# Patient Record
Sex: FEMALE | Race: WHITE
[De-identification: names, ages, dates, MRNs, and addresses within clinical notes are randomized per-mention and may not be internally consistent; named-entity substitution may affect disease eponyms.]

---

## 2017-03-02 ENCOUNTER — HOSPITAL ENCOUNTER (OUTPATIENT)
Dept: HOSPITAL 62 - RAD | Age: 72
End: 2017-03-02
Attending: SPECIALIST
Payer: MEDICARE

## 2017-03-02 DIAGNOSIS — R13.12: ICD-10-CM

## 2017-03-02 DIAGNOSIS — K21.9: Primary | ICD-10-CM

## 2017-03-02 PROCEDURE — 92611 MOTION FLUOROSCOPY/SWALLOW: CPT

## 2017-03-02 PROCEDURE — 74230 X-RAY XM SWLNG FUNCJ C+: CPT

## 2017-03-02 NOTE — ST MODIFIED BARIUM SWALLOW
Recommendation





- Recommendations


Recommendations: 1) DIET: Recommend mechanical soft cut meats and thin liquids.

  2) STRATEGIES: Alternate bites and sips-to aid in clearing residuals.  3) Pt 

reports has follow-up with GI, ST in agreement. Esophageal stage-RA noted 

possible Zenker's diverticulum.  4) Pt reports difficulty taking pills. ST 

recommends attempting pills in puree, if continued difficulty crush pills if 

able in puree.  5) THERAPY: ST recommends home health or outpatient speech 

therapy targeting pharyngeal strengthening.  SUMMARY:  Pt presents with a mild-

moderate oropharyngeal, mild-moderate pharyngeal dysphagia, moderate 

pharyngoesophageal dysphagia characterized by reduced base of tongue, reduced 

laryngeal elevation, reduced hyolaryngeal excursion, reduced opening of 

cricopharyngeus. Deficits resulting in residuals of solids at the level of the 

valleculae and the pyriforms. Residuals observed to clear with liquid wash. 

Prominance at approximately level of C5 likely from osteophyte. At end of study

, during recommendations, pt began to make glottal noises - not judged to be a 

cough by ST.  stated "she thats what she does!" No evidence of 

pharyngeal residuals observed at end of study to indicate cause.





Medical Diagnoses





- Medical Diagnoses


Medical Diagnosis Description & ICD-10 Code(s): GERD K21.9


Other Medical Diagnoses/Co-Morbidities: dementia, reflux, stomach ulcer, 

diabetes





- ICD-10 Tx Diagnosis Coding


(1) Dysphagia, oropharyngeal phase


ICD-10 Code(s): R13.12 - DYSPHAGIA, OROPHARYNGEAL PHASE





(2) Dysphagia, pharyngeal phase


ICD-10 Code(s): R13.13 - DYSPHAGIA, PHARYNGEAL PHASE





(3) Dysphagia, pharyngoesophageal phase


ICD-10 Code(s): R13.14 - DYSPHAGIA, PHARYNGOESOPHAGEAL PHASE








ST Modified Barium Swallow





- General


Date: 03/02/17


Referring Physician: Dr Harrington


Risks/Precautions: None


Date of Onset: 11/03/16


Reason for Referral: GERD





- History


History obtained from: Patient - pt reports dementia-poor historian throughout 

history intake. Pt's  providing most of information., Parent/Caregiver


-: Medical - Pt referred by GI due to GERD. Pt's  states pt coughs and 

chokes on solids and liquids, coughing continues after meals. Pt denies globus 

sensation during meals however states feels sticking with medications. Pt's 

 denies history of PNA or bronchitis. Pt's  states onset of 

symptoms began approximately 4 months ago.  PMHx: pt's  states pt seen 

by ENT who stated no irritation of vocal folds and WNL ENT then referred to GI. 

Pt recently admitted to hospital per pt's  for a stomach ulcer. Dementia

, reflux, diabetes, back pain.


Medications: protonix, metformin, tramadol, vitamin D3, calcium, synthroid, and 

"about a half a dozen other vitamins" per pt's .


Allergies: cipro





- Functional Status


Prior Functional Status: INDEPENDENT: feeding


Current Functional Limitations: feeding





- Subjective


Patient/caregiver goal(s): safe swallow


Cognitive-Linguistic Function: Moderately Impaired - history of dementia


Speech Intelligibility: WNL


Current Nutritional Means: PO


Current PO diet: Soft


Current symptoms: Coughing


Pain: 0/5





- Objective


Assessment: Upright, Left Lateral





- Food Trials Used


Food trials used: Thin liquids, Pureed, Soft solids


The patient: Was Able to Self Feed





- Oral-Motor Skills


Velo-pharyngeal function: Unremarkable


Laryngeal Function: Volitional Cough, Volitional Swallow





- Assessment


Oral prep: Normal


Labial closure: Adequate


Leakage: None


Mastication: Adequate


Lingual Movement: Weak - weak anterior propulsion


Oral stage: Mildly Impaired





- Pharyngeal Stage


Initiation of Pharyngeal Stage Reflex: Normal


Decreased laryngeal elevation: Yes - mild


Reduced Velopharyngeal Closure: no


Reduced pressure generation: Yes - moderate


reduced tongue-based retraction: Yes - moderate


Pre-swallow pooling in valleculae: None


Pre-Swallow pooling in pyriforms: None


Reduced epiglottic excursion: Yes - mild


Reduced pharyngeal peristalsis/contraction: Yes - mild-moderate


Post-swallow residulas vallecular: Moderate - on solids


Post-Swallow residuals in pyriforms: Moderate - on solids


Reduced Cricopharyngeal opening: Yes





- Fall Risk Assessment


Medications/Conditions that increase fall risks include: Antidepressants, 

sedatives, anti-arrhythmic, diuretic, benzodiazipenes, neuroleptics.  BP 

regulation problems, cardiac problems, balance or gait deficits, neurological 

problems.


Is patient considered at risk for falls: no


Fall Risk Actions Taken: No action needed





- Behavioral Observations


During evaluation process patient: was pleasant, was cooperative





- Treatment / Educational Needs:


Treatment/Education Needs: Treatment consisted of patient education on the role 

of the Speech Pathologist.  Patient's plan of care and golas were communicated 

as well as scheduling and attendance policies.  Recommendations for initial 

home program were shared.  Patient demonstrated understanding and verbalized 

agreement.


Initial home program recommendations: pt and pt's  provided with verbal 

and written education on aspiration PNA, recommendations from study.





- Impression/Summary


Laryngeal Penetration: Yes, Flash, Cleared


Consistency: Thin


Tracheal Aspiration: no


Patient presents with: Pharyngeal stage dysph., Oral-Pharyngeal dysph.


Risk of Aspiration: Moderate - mild-moderate





- Recommendations


NPO: no


Solid diet recommendations: Mechanical Soft, Chopped Meat


Liquid Diet Modification: Thin


Strict aspiration precautions: Yes


Pt/Family education and followup with MD: Yes


Dysphagia therapy with SLP: yes, dysphagia therapy


Recommended techniques: Fully Upright During Meal, Small Bites and Sips, 

Alternate Bites/Sips


Supervision: Distant


Information, Precautions and Recommendations: Patient (Written), Patient (Verbal

), Family Member (Written), Family Member (Verbal)





- Time


Total Time: 30





- Plan of Care


Strategies to optimize patient understanding include:: ongoing assessment of 

educational needs, implementation of educational strategies, and re-education.





- -


-: Thank you for the opportunity to work with this patient and his/her family.  

Should you have any questions about this patient's plan or progress, I can be 

reached at [phone].





Charge G Code?





- -


-: Yes





ST F.L. Impairment Category





- Rationale Based On


Rationale Based On: Clin Find., Obj Measures





- Swallowing


Current : CK 40-59% Impaired


Goal : CK 40-59% Impaired


Discharge : CK 40-59% Impaired

## 2017-03-30 ENCOUNTER — HOSPITAL ENCOUNTER (OUTPATIENT)
Dept: HOSPITAL 62 - OD | Age: 72
End: 2017-03-30
Attending: SPECIALIST
Payer: MEDICARE

## 2017-03-30 DIAGNOSIS — R10.9: Primary | ICD-10-CM

## 2017-03-30 DIAGNOSIS — D50.9: ICD-10-CM

## 2017-03-30 LAB
BASOPHILS # BLD AUTO: 0 10^3/UL (ref 0–0.2)
BASOPHILS NFR BLD AUTO: 0.4 % (ref 0–2)
EOSINOPHIL # BLD AUTO: 0 10^3/UL (ref 0–0.6)
EOSINOPHIL NFR BLD AUTO: 0.5 % (ref 0–6)
ERYTHROCYTE [DISTWIDTH] IN BLOOD BY AUTOMATED COUNT: 13.5 % (ref 11.5–14)
HCT VFR BLD CALC: 32.6 % (ref 36–47)
HGB BLD-MCNC: 11.2 G/DL (ref 12–15.5)
HGB HCT DIFFERENCE: 1
LYMPHOCYTES # BLD AUTO: 2.6 10^3/UL (ref 0.5–4.7)
LYMPHOCYTES NFR BLD AUTO: 30.7 % (ref 13–45)
MCH RBC QN AUTO: 31.2 PG (ref 27–33.4)
MCHC RBC AUTO-ENTMCNC: 34.4 G/DL (ref 32–36)
MCV RBC AUTO: 91 FL (ref 80–97)
MONOCYTES # BLD AUTO: 0.8 10^3/UL (ref 0.1–1.4)
MONOCYTES NFR BLD AUTO: 9 % (ref 3–13)
NEUTROPHILS # BLD AUTO: 5 10^3/UL (ref 1.7–8.2)
NEUTS SEG NFR BLD AUTO: 59.4 % (ref 42–78)
RBC # BLD AUTO: 3.59 10^6/UL (ref 3.72–5.28)
WBC # BLD AUTO: 8.4 10^3/UL (ref 4–10.5)

## 2017-03-30 PROCEDURE — 36415 COLL VENOUS BLD VENIPUNCTURE: CPT

## 2017-03-30 PROCEDURE — 85025 COMPLETE CBC W/AUTO DIFF WBC: CPT

## 2017-03-30 PROCEDURE — 83540 ASSAY OF IRON: CPT

## 2017-06-12 ENCOUNTER — HOSPITAL ENCOUNTER (EMERGENCY)
Dept: HOSPITAL 62 - ER | Age: 72
Discharge: HOME | End: 2017-06-12
Payer: MEDICARE

## 2017-06-12 VITALS — DIASTOLIC BLOOD PRESSURE: 45 MMHG | SYSTOLIC BLOOD PRESSURE: 90 MMHG

## 2017-06-12 DIAGNOSIS — R11.0: ICD-10-CM

## 2017-06-12 DIAGNOSIS — Z87.440: ICD-10-CM

## 2017-06-12 DIAGNOSIS — R19.7: ICD-10-CM

## 2017-06-12 DIAGNOSIS — M54.41: ICD-10-CM

## 2017-06-12 DIAGNOSIS — Z79.891: ICD-10-CM

## 2017-06-12 DIAGNOSIS — G89.29: Primary | ICD-10-CM

## 2017-06-12 DIAGNOSIS — R63.0: ICD-10-CM

## 2017-06-12 DIAGNOSIS — E11.9: ICD-10-CM

## 2017-06-12 LAB
ALBUMIN SERPL-MCNC: 3.7 G/DL (ref 3.5–5)
ALP SERPL-CCNC: 52 U/L (ref 38–126)
ALT SERPL-CCNC: 32 U/L (ref 9–52)
ANION GAP SERPL CALC-SCNC: 10 MMOL/L (ref 5–19)
APPEARANCE UR: (no result)
AST SERPL-CCNC: 24 U/L (ref 14–36)
BASOPHILS # BLD AUTO: 0 10^3/UL (ref 0–0.2)
BASOPHILS NFR BLD AUTO: 0.5 % (ref 0–2)
BILIRUB DIRECT SERPL-MCNC: 0.3 MG/DL (ref 0–0.4)
BILIRUB SERPL-MCNC: 0.7 MG/DL (ref 0.2–1.3)
BILIRUB UR QL STRIP: NEGATIVE
BUN SERPL-MCNC: 18 MG/DL (ref 7–20)
CALCIUM: 9.4 MG/DL (ref 8.4–10.2)
CHLORIDE SERPL-SCNC: 100 MMOL/L (ref 98–107)
CO2 SERPL-SCNC: 26 MMOL/L (ref 22–30)
CREAT SERPL-MCNC: 0.7 MG/DL (ref 0.52–1.25)
EOSINOPHIL # BLD AUTO: 0 10^3/UL (ref 0–0.6)
EOSINOPHIL NFR BLD AUTO: 0.3 % (ref 0–6)
ERYTHROCYTE [DISTWIDTH] IN BLOOD BY AUTOMATED COUNT: 13.1 % (ref 11.5–14)
GLUCOSE SERPL-MCNC: 100 MG/DL (ref 75–110)
GLUCOSE UR STRIP-MCNC: NEGATIVE MG/DL
HCT VFR BLD CALC: 32.6 % (ref 36–47)
HGB BLD-MCNC: 10.9 G/DL (ref 12–15.5)
HGB HCT DIFFERENCE: 0.1
KETONES UR STRIP-MCNC: NEGATIVE MG/DL
LIPASE SERPL-CCNC: 103.7 U/L (ref 23–300)
LYMPHOCYTES # BLD AUTO: 1.7 10^3/UL (ref 0.5–4.7)
LYMPHOCYTES NFR BLD AUTO: 16.7 % (ref 13–45)
MCH RBC QN AUTO: 31.1 PG (ref 27–33.4)
MCHC RBC AUTO-ENTMCNC: 33.4 G/DL (ref 32–36)
MCV RBC AUTO: 93 FL (ref 80–97)
MONOCYTES # BLD AUTO: 1.4 10^3/UL (ref 0.1–1.4)
MONOCYTES NFR BLD AUTO: 14.1 % (ref 3–13)
NEUTROPHILS # BLD AUTO: 6.9 10^3/UL (ref 1.7–8.2)
NEUTS SEG NFR BLD AUTO: 68.4 % (ref 42–78)
NITRITE UR QL STRIP: NEGATIVE
PH UR STRIP: 5 [PH] (ref 5–9)
POTASSIUM SERPL-SCNC: 3.8 MMOL/L (ref 3.6–5)
PROT SERPL-MCNC: 6.6 G/DL (ref 6.3–8.2)
PROT UR STRIP-MCNC: NEGATIVE MG/DL
RBC # BLD AUTO: 3.5 10^6/UL (ref 3.72–5.28)
SODIUM SERPL-SCNC: 135.9 MMOL/L (ref 137–145)
SP GR UR STRIP: 1.01
UROBILINOGEN UR-MCNC: NEGATIVE MG/DL (ref ?–2)
WBC # BLD AUTO: 10 10^3/UL (ref 4–10.5)

## 2017-06-12 PROCEDURE — 36415 COLL VENOUS BLD VENIPUNCTURE: CPT

## 2017-06-12 PROCEDURE — 96374 THER/PROPH/DIAG INJ IV PUSH: CPT

## 2017-06-12 PROCEDURE — 80053 COMPREHEN METABOLIC PANEL: CPT

## 2017-06-12 PROCEDURE — 81001 URINALYSIS AUTO W/SCOPE: CPT

## 2017-06-12 PROCEDURE — 72110 X-RAY EXAM L-2 SPINE 4/>VWS: CPT

## 2017-06-12 PROCEDURE — 85025 COMPLETE CBC W/AUTO DIFF WBC: CPT

## 2017-06-12 PROCEDURE — 99284 EMERGENCY DEPT VISIT MOD MDM: CPT

## 2017-06-12 PROCEDURE — 83690 ASSAY OF LIPASE: CPT

## 2017-06-12 PROCEDURE — 96375 TX/PRO/DX INJ NEW DRUG ADDON: CPT

## 2017-06-12 NOTE — ER DOCUMENT REPORT
ED Neck/Back Problem





- General


Mode of Arrival: Wheelchair


Information source: Patient, Relative


TRAVEL OUTSIDE OF THE [place] IN LAST 30 DAYS: No





- HPI


Patient complains to provider of: Pain, Lower back - right


Onset: Other - 'years'


Context: Other - see notes above


Associated symptoms: Other - see notes above





<BRIAN CALLE - Last Filed: 06/13/17 03:32>





<JENNIFER MCNEILL - Last Filed: 06/13/17 03:52>





- General


Chief Complaint: Back Pain


Stated Complaint: BODY PAIN


Time Seen by Provider: 06/12/17 19:50


Notes: 


72 year old female with history of chronic lower back pain presents to the ED 

complaining of right lower back pain that has worsened over the past 48 hours.  

Patient's  reports that the patient was having difficulty sleeping last 

night.  He states that the patient points to the right back when asked where 

the pain is located.   states that the pain bothers the patient, but she 

can usually manage it.  The pain worsened to the point that the  had to 

help the patient with a walker in order to use the bathroom.  States that the 

patient also has chronic diarrhea and was complaining of nausea this morning.  

Patient denies falling, urinary symptoms, or vomiting.  This persisted patient 

is less active has had poor appetite for the past 6 months.  Patient was 

recently taken off of metformin and 1 of iron pills. Patient was on Mobic 

earlier last year but was changed to Tramadol 5 months ago when she developed 

ulcers secondary to the Mobic. Patient has had an MRI in the past which was 

negative. (BRIAN CALLE)





- Related Data


Allergies/Adverse Reactions: 


 





No Known Allergies Allergy (Verified 10/05/13 09:58)


 











Past Medical History





- General


Information source: Patient





- Social History


Smoking Status: Unknown if Ever Smoked


Family History: Reviewed & Not Pertinent


Patient has suicidal ideation: No


Patient has homicidal ideation: No





- Past Medical History


Cardiac Medical History: Reports: Hx Hypercholesterolemia


Neurological Medical History: Denies: Hx Seizures


Endocrine Medical History: Reports: Hx Diabetes Mellitus Type 2


Renal/ Medical History: Reports: Other - urinary tract infection.  Denies: Hx 

Peritoneal Dialysis


GI Medical History: Reports: Hx Gastroesophageal Reflux Disease


Musculoskeltal Medical History: Reports Hx Arthritis


Psychiatric Medical History: Reports: Hx Dementia


Past Surgical History: Reports: Hx Appendectomy, Hx Hysterectomy





- Immunizations


Hx Diphtheria, Pertussis, Tetanus Vaccination: Yes





<BRIAN CALLE - Last Filed: 06/13/17 03:32>





Review of Systems





- Review of Systems


Constitutional: No symptoms reported


EENT: No symptoms reported


Cardiovascular: No symptoms reported


Respiratory: No symptoms reported


Gastrointestinal: See HPI, Diarrhea, Nausea, Poor appetite - 6 months.  denies: 

Vomiting


Genitourinary: No symptoms reported.  denies: Burning, Dysuria, Flank pain, 

Hematuria


Female Genitourinary: No symptoms reported


Musculoskeletal: See HPI, Back pain - lower right


Skin: No symptoms reported


Hematologic/Lymphatic: No symptoms reported


Neurological/Psychological: No symptoms reported


-: Yes All other systems reviewed and negative





<BRIAN CALLE - Last Filed: 06/13/17 03:32>





Physical Exam





- General


General appearance: Alert


In distress: None





- HEENT


Head: Normocephalic, Atraumatic


Eyes: Normal


Extraocular movements intact: Yes


Pupils: PERRL


Neck: Normal - non tender





- Respiratory


Respiratory status: No respiratory distress


Breath sounds: Normal





- Cardiovascular


Rhythm: Regular


Heart sounds: Normal auscultation





- Abdominal


Inspection: Normal


Distension: No distension


Bowel sounds: Normal


Tenderness: Nontender





- Back


Back: Normal, Nontender





- Extremities


General upper extremity: Normal inspection, Normal ROM


General lower extremity: Normal ROM.  No: Normal inspection - Her pain is over 

the right buttock region with positive right straight leg raise test.





- Neurological


Neuro grossly intact: Yes


Cognition: Normal


Orientation: AAOx4


Anuhska Coma Scale Eye Opening: Spontaneous


Anushka Coma Scale Verbal: Oriented


Anushka Coma Scale Motor: Obeys Commands


Anushka Coma Scale Total: 15


Speech: Normal





- Psychological


Associated symptoms: Normal affect, Normal mood





- Skin


Skin Temperature: Warm


Skin Moisture: Dry


Skin Color: Normal





<BRIAN CALLE - Last Filed: 06/13/17 03:32>





Course





- Laboratory


Result Diagrams: 


 06/12/17 20:40





 06/12/17 20:40





<BRIAN CALLE - Last Filed: 06/13/17 03:32>





- Laboratory


Result Diagrams: 


 06/12/17 20:40





 06/12/17 20:40





<JENNIFER MCNEILL - Last Filed: 06/13/17 03:52>





- Re-evaluation


Re-evalutation: 





06/12/17 23:15


With a history of dementia presents emerged from with her  with a chief 

complaint of low back pain.  He states she struggle with low back pain for a 

couple years and was put on Ultram in December.  She has episodes where the 

Ultram does not help it.  He said for the past 2 days anytime she moves or 

tries to change position in bed she yells out in pain and holds her leg.  He 

denies any recent fall fevers chills cough chest pain or shortness of breath 

has not been any nausea vomiting dull pain diarrhea or urinary complaints.  On 

examination she is well-appearing nontoxic afebrile with no midline cervical 

thoracic or lumbosacral tenderness.  Her pain is over the right buttock region 

with positive right straight leg raise test.  No pulsatile dullness or hernias 

good pulses and perfusion to lower extremities.  Full workup negative for acute 

urinary tract infection no concerns for aortic aneurysm or kidney stones.  

Significant improvement with pain shot and ambulated about the department with 

the nurse.  Feels significantly better and be discharged home with a 

prescription for a few days with the pain medication follow-up closely with 

primary care physician and discuss reasons for ED return to (JENNIFER MCNEILL)





- Vital Signs


Vital signs: 


 











Temp Pulse Resp BP Pulse Ox


 


 100 F   72   16   90/45 L  96 


 


 06/12/17 19:05  06/12/17 23:48  06/12/17 23:48  06/12/17 23:48  06/12/17 23:48














- Laboratory


Laboratory results interpreted by me: 


 











  06/12/17 06/12/17





  20:40 20:40


 


RBC  3.50 L 


 


Hgb  10.9 L 


 


Hct  32.6 L 


 


Monocytes %  14.1 H 


 


Sodium   135.9 L














Discharge





<BRIAN CALLE - Last Filed: 06/13/17 03:32>





<JENNIFER MCNEILL - Last Filed: 06/13/17 03:52>





- Discharge


Clinical Impression: 


Low back pain


Qualifiers:


 Chronicity: unspecified Back pain laterality: right Sciatica presence: with 

sciatica Sciatica laterality: sciatica of right side Qualified Code(s): M54.41 

- Lumbago with sciatica, right side





Condition: Stable


Disposition: HOME, SELF-CARE


Instructions:  Oral Narcotic Medication ([facility])


Additional Instructions: 


Low Back Pain





     Three out of every four people will have an episode of disabling back pain 

during their lifetime.  Most commonly the pain is due to straining of the 

muscles and ligaments in the low back.


     Usual treatment includes: 


(1) Rest on a firm surface.  Avoid lying on your stomach.  


(2) Ice pack the painful area.  After a few days, gentle heat may be used 

intermittently to relax the area, or ice packs can be continued.  


(3) Medication may be needed -- muscle relaxers and antiinflammatory medicines 

are commonly used.  


(4) As the back improves, exercises are prescribed to strengthen the back and 

abdominal muscles.


     Your doctor will advise you on the proper care for your back at each stage 

in your recovery.  You may be better in a few days -- or healing may take 

several weeks.


     If new symptoms of a "herniated disc" (radiation of pain, numbness, or 

tingling down the back of the leg or weakness in the leg) occur, you should be 

re-examined.  Further testing may be necessary.


Prescriptions: 


Hydrocodone/Acetaminophen [Norco 5-325 mg Tablet] 1 tab PO TID #8 tablet


Referrals: 


ANNE-MARIE FRAZIER MD [Primary Care Provider] -  (Call for an appointment in a.m. to be 

seen in follow-up in 2-3 days return for increasing worsening or new symptoms)


Scribe Attestation: 





06/12/17 23:15


I personally performed the services described in the documentation reviewed the 

documentation recorded by my scribe in my presence and it accurately and 

completely records my words and actions (JENNIFER MCNEILL)





Scribe Documentation





- Scribe


Written by Shelton:: Shelton Barrow, 06/12/2017 2146


acting as scribe for :: Polo





<BRIAN CALLE - Last Filed: 06/13/17 03:32>

## 2017-06-12 NOTE — RADIOLOGY REPORT (SQ)
EXAM DESCRIPTION:  L SPINE WHOLE



COMPLETED DATE/TIME:  6/12/2017 9:04 pm



REASON FOR STUDY:  pain



COMPARISON:  CT December 2016



NUMBER OF VIEWS:  Five views including obliques.



TECHNIQUE:  AP, lateral, oblique, and sacral radiographic images acquired of the lumbar spine.



LIMITATIONS:  None.



FINDINGS:  MINERALIZATION: Normal.

SEGMENTATION: Transitional S1.

ALIGNMENT: Similar grade 1 anterolisthesis of L5 on S1.

VERTEBRAE: Maintained height.  No fracture or worrisome bone lesion.

DISCS: Multilevel disc space narrowing with osteophytes.

POSTERIOR ELEMENTS: Pedicles and facets are intact.  No pars defect or posterior arch defects.  Sever
e Facet arthropathy is present.

HARDWARE: None in the spine.

PARASPINAL SOFT TISSUES: Normal.

PELVIS: Intact as visualized. No fractures or worrisome bone lesions. SI joints intact.

OTHER: No other significant finding.



IMPRESSION:  SPONDYLOSIS WITHOUT BONE LESION OR FRACTURE.



TECHNICAL DOCUMENTATION:  JOB ID:  [id number]

 2011 [employer]- All Rights Reserved

## 2017-10-25 ENCOUNTER — HOSPITAL ENCOUNTER (OUTPATIENT)
Dept: HOSPITAL 62 - END | Age: 72
Discharge: HOME | End: 2017-10-25
Attending: INTERNAL MEDICINE
Payer: MEDICARE

## 2017-10-25 VITALS — SYSTOLIC BLOOD PRESSURE: 114 MMHG | DIASTOLIC BLOOD PRESSURE: 74 MMHG

## 2017-10-25 DIAGNOSIS — K44.9: ICD-10-CM

## 2017-10-25 DIAGNOSIS — E11.9: ICD-10-CM

## 2017-10-25 DIAGNOSIS — Z79.84: ICD-10-CM

## 2017-10-25 DIAGNOSIS — K22.2: ICD-10-CM

## 2017-10-25 DIAGNOSIS — F03.90: ICD-10-CM

## 2017-10-25 DIAGNOSIS — K21.0: Primary | ICD-10-CM

## 2017-10-25 DIAGNOSIS — Z79.899: ICD-10-CM

## 2017-10-25 DIAGNOSIS — Z79.1: ICD-10-CM

## 2017-10-25 DIAGNOSIS — K29.50: ICD-10-CM

## 2017-10-25 PROCEDURE — 88342 IMHCHEM/IMCYTCHM 1ST ANTB: CPT

## 2017-10-25 PROCEDURE — 43239 EGD BIOPSY SINGLE/MULTIPLE: CPT

## 2017-10-25 PROCEDURE — 0DB58ZX EXCISION OF ESOPHAGUS, VIA NATURAL OR ARTIFICIAL OPENING ENDOSCOPIC, DIAGNOSTIC: ICD-10-PCS | Performed by: INTERNAL MEDICINE

## 2017-10-25 PROCEDURE — 88305 TISSUE EXAM BY PATHOLOGIST: CPT

## 2017-10-25 PROCEDURE — 0DB68ZX EXCISION OF STOMACH, VIA NATURAL OR ARTIFICIAL OPENING ENDOSCOPIC, DIAGNOSTIC: ICD-10-PCS | Performed by: INTERNAL MEDICINE

## 2017-10-25 PROCEDURE — 82962 GLUCOSE BLOOD TEST: CPT

## 2017-10-25 NOTE — OPERATIVE REPORT
Operative Report


DATE OF SURGERY: 10/25/17


Operative Report: 





The risks benefits and alternatives of the procedure explained to the patient 

in detail and informed consent is obtained.A GIF Olympus video scope was 

inserted into the patient's mouth and hypopharynx, the esophagus is identified 

intubated and insufflated, the scope was then advanced through the esophagus 

stomach and duodenum, retroflexion maneuver is done, the esophagus stomach and 

first and second portions of the duodenum examined


PREOPERATIVE DIAGNOSIS: Dysphagia, gastroesophageal reflux disease


POSTOPERATIVE DIAGNOSIS: Gastritis status post biopsy.  Hiatal hernia.  Schatzki

's ring status post breakage, biopsy sent rule out Freedman's


OPERATION: EGD with biopsy


SURGEON: JUAN JOSE VASQUEZ


ANESTHESIA: Moderate Sedation - 2 mg of Versed, 12.5 mg of fentanyl.  Conscious 

sedation monitoring time 30 minutes.


TISSUE REMOVED OR ALTERED: As noted above.


COMPLICATIONS: 





None.


ESTIMATED BLOOD LOSS: None.


INTRAOPERATIVE FINDINGS: As noted above.


PROCEDURE: 





Patient tolerated the procedure well


No immediate postprocedure complications are noted.


Patient discharged in good condition.


Discharge date 10/25/2017.


Discharge diet: Regular.


Discharge activity: Regular.


2-3 week follow-up to discuss findings.


Patient is instructed to call the office or proceed to the emergency room 

should there be any further problems or questions.


We will wait on pathology.

## 2017-11-27 ENCOUNTER — HOSPITAL ENCOUNTER (OUTPATIENT)
Dept: HOSPITAL 62 - WI | Age: 72
End: 2017-11-27
Attending: INTERNAL MEDICINE
Payer: MEDICARE

## 2017-11-27 DIAGNOSIS — Z12.31: Primary | ICD-10-CM

## 2017-11-27 PROCEDURE — G0202 SCR MAMMO BI INCL CAD: HCPCS

## 2017-11-27 PROCEDURE — 77067 SCR MAMMO BI INCL CAD: CPT

## 2017-11-28 NOTE — WOMENS IMAGING REPORT
EXAM DESCRIPTION:  BILAT SCREENING MAMMO W/CAD



COMPLETED DATE/TIME:  11/27/2017 9:11 am



REASON FOR STUDY:  SCREENING MAMMO Z12.31  ENCNTR SCREEN MAMMOGRAM FOR MALIGNANT NEOPLASM OF GREGG



COMPARISON:  Multiple since 2011



TECHNIQUE:  Standard craniocaudal and mediolateral oblique views of each breast recorded using digita
l acquisition.



LIMITATIONS:  None.



FINDINGS:  Findings present which are benign by mammographic criteria.  No suspicious masses, calcifi
cations or architectural distortion.

Pertinent benign findings: Stable benign arterial vascular and breast parenchymal calcifications

Read with the assistance of CAD.

.[facility] - R2 Cenova Version 1.3

.[facility] - R2 Cenova Version 1.3

.[facility] - R2 Cenova Version 2.4

.[facility] - R2 Cenova Version 2.4

.[facility] - R2  Version 9.2

Benign mammographic findings may include one or more of the following:  Smooth masses, popcorn/rim/co
arse calcifications, asymmetries, post-procedure changes, and lesions with long-standing stability.



IMPRESSION:  BENIGN MAMMOGRAPHIC FINDINGS.  BIRADS 2



BREAST DENSITY:  c. The breasts are heterogeneously dense, which may obscure small masses.



BIRAD:  2 BENIGN FINDING(S)



RECOMMENDATION:  ROUTINE SCREENING

Please consider bilateral screening tomosynthesis in November 2018, given heterogeneously dense tissu
e



COMMENT:  The patient has been notified of the results by letter per SA requirements. Additional no
tification policies are in place for contacting patient with suspicious or incomplete findings.

Quality ID #[id number]: The American College of Radiology recommends an annual screening mammogram for women
 aged 40 years or over. This facility utilizes a reminder system to ensure that all patients receive 
reminder letters, and/or direct phone calls for appointments. This includes reminders for routine scr
eening mammograms, diagnostic mammograms, or other Breast Imaging Interventions when appropriate.  Th
is patient will be placed in the appropriate reminder system.

The American College of Radiology (ACR) has developed recommendations for screening MRI of the breast
s in certain patient populations, to be used in conjunction with mammography.  Breast MRI surveillanc
e may be appropriate for women with more than 20% lifetime risk of developing breast cancer  as deter
mined by genetic testing, significant family history of the disease, or history of mantle radiation f
or Hodgkins Disease.  ACR Practice Guidelines 2008.



TECHNICAL DOCUMENTATION:  FINDING NUMBER: (1)

ASSESSMENT: (1)

JOB ID:  [id number]

 2011 [employer]- All Rights Reserved

## 2019-01-11 ENCOUNTER — HOSPITAL ENCOUNTER (OUTPATIENT)
Dept: HOSPITAL 62 - WI | Age: 74
End: 2019-01-11
Attending: INTERNAL MEDICINE
Payer: MEDICARE

## 2019-01-11 DIAGNOSIS — M81.0: ICD-10-CM

## 2019-01-11 DIAGNOSIS — Z12.31: Primary | ICD-10-CM

## 2019-01-11 PROCEDURE — 77063 BREAST TOMOSYNTHESIS BI: CPT

## 2019-01-11 PROCEDURE — 77080 DXA BONE DENSITY AXIAL: CPT

## 2019-01-11 PROCEDURE — 77067 SCR MAMMO BI INCL CAD: CPT

## 2019-01-11 NOTE — WOMENS IMAGING REPORT
EXAM DESCRIPTION:  BONE DENSITY HIP/SPINE



COMPLETED DATE/TIME:  1/11/2019 10:04 am



REASON FOR STUDY:  M81.0 Z12.31  ENCNTR SCREEN MAMMOGRAM FOR MALIGNANT NEOPLASM OF GREGG M81.0  AGE-REL
ATED OSTEOPOROSIS W/O CURRENT PATHOLOGICAL FRAC



COMPARISON:   11/22/2016.



TECHNIQUE:  Dual-Energy X-ray Absorptiometry (DEXA) of the AP Spine and Hip.



LIMITATIONS:  None.



FINDINGS:  LUMBAR SPINE:

The bone mineral density (BMD) measured from L1-L4 in the AP projection correlates with a T-score of 
-0.3, which is normal as defined by the World Health Organization.

HIP:

The bone mineral density (BMD) measured in the left hip correlates with a T-score of -1.5, which is o
steopenia as defined by the World Health Organization.



IMPRESSION:  1. LUMBAR SPINE: NORMAL.

2.  HIP: OSTEOPENIA.



COMMENT:  The World Health Organization defines low BMD as follows:

T-score:

Normal:  Greater than -1.0

Osteopenia: Between -1.0 and -2.5

Osteoporosis:  Less than -2.5 without fractures

Established osteoporosis:  Less than -2.5 with fractures

In general, you may wish to consider:

Diagnosis          Treatment                     Follow-up DEXA

Normal BMD      Prevention                    2-3 years

Osteopenia       Prevention/Therapy        1-2 years

Osteoporosis     Therapy                        Yearly



TECHNICAL DOCUMENTATION:  JOB ID:  [id number]

 [employer]- All Rights Reserved



Reading location - IP/workstation name: [email or web address]

## 2019-01-11 NOTE — WOMENS IMAGING REPORT
EXAM DESCRIPTION:  3D SCREENING MAMMO BILAT



COMPLETED DATE/TIME:  1/11/2019 10:04 am



REASON FOR STUDY:  ROUTINE SCREENING MAMMOGRAM Z12.31 Z12.31  ENCNTR SCREEN MAMMOGRAM FOR MALIGNANT N
EOPLASM OF GREGG M81.0  AGE-RELATED OSTEOPOROSIS W/O CURRENT PATHOLOGICAL FRAC



COMPARISON:  11/27/2017 and 11/22/2016.



TECHNIQUE:  Standard craniocaudal and mediolateral oblique views of each breast recorded using digita
l acquisition and breast tomosynthesis.



LIMITATIONS:  None.



FINDINGS:  No masses, calcifications or architectural distortion. No areas of suspicion.

Read with the assistance of CAD.

.[facility] - R2 Cenova Version 1.3

.[facility] - R2 Cenova Version 1.3

.[facility] - R2 Cenova Version 2.4

.[facility] [place] - R2 Cenova Version 2.4

.[facility] - R2  Version 9.2



IMPRESSION:  NORMAL MAMMOGRAM.  BIRADS 1.



BREAST DENSITY:  c. The breasts are heterogeneously dense, which may obscure small masses.



BIRAD:  1 NEGATIVE



RECOMMENDATION:  ROUTINE SCREENING



COMMENT:  The patient has been notified of the results by letter per SA requirements. Additional no
tification policies are in place for contacting patient with suspicious or incomplete findings.

Quality ID #225: The American College of Radiology recommends an annual screening mammogram for women
 aged 40 years or over. This facility utilizes a reminder system to ensure that all patients receive 
reminder letters, and/or direct phone calls for appointments. This includes reminders for routine scr
eening mammograms, diagnostic mammograms, or other Breast Imaging Interventions when appropriate.  Th
is patient will be placed in the appropriate reminder system.

The American College of Radiology (ACR) has developed recommendations for screening MRI of the breast
s in certain patient populations, to be used in conjunction with mammography.  Breast MRI surveillanc
e may be appropriate for women with more than 20% lifetime risk of developing breast cancer  as deter
mined by genetic testing, significant family history of the disease, or history of mantle radiation f
or Hodgkins Disease.  ACR Practice Guidelines 2008.

[employer]



PQRS 6045F: Fluoroscopic imaging is not utilized for breast tomosynthesis.



TECHNICAL DOCUMENTATION:  FINDING NUMBER: (1)

ASSESSMENT:  (1)

JOB ID:  [id number]

 2011 [employer]- All Rights Reserved



Reading location - IP/workstation name: [facility]-RR2

## 2019-07-20 ENCOUNTER — HOSPITAL ENCOUNTER (INPATIENT)
Dept: HOSPITAL 62 - ER | Age: 74
LOS: 8 days | DRG: 690 | End: 2019-07-28
Attending: INTERNAL MEDICINE | Admitting: INTERNAL MEDICINE
Payer: MEDICARE

## 2019-07-20 DIAGNOSIS — E03.9: ICD-10-CM

## 2019-07-20 DIAGNOSIS — B96.20: ICD-10-CM

## 2019-07-20 DIAGNOSIS — F02.80: ICD-10-CM

## 2019-07-20 DIAGNOSIS — N30.00: Primary | ICD-10-CM

## 2019-07-20 DIAGNOSIS — Z66: ICD-10-CM
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
## 2019-07-20 DIAGNOSIS — R13.13: ICD-10-CM
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
## 2019-07-20 DIAGNOSIS — D72.829: ICD-10-CM

## 2019-07-20 DIAGNOSIS — E11.8: ICD-10-CM
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
## 2019-07-20 DIAGNOSIS — G30.9: ICD-10-CM

## 2019-07-20 DIAGNOSIS — G93.40: ICD-10-CM

## 2019-07-20 LAB
ADD MANUAL DIFF: NO
ALBUMIN SERPL-MCNC: 3.8 G/DL (ref 3.5–5)
ALP SERPL-CCNC: 61 U/L (ref 38–126)
ALT SERPL-CCNC: 33 U/L (ref 9–52)
ANION GAP SERPL CALC-SCNC: 8 MMOL/L (ref 5–19)
APPEARANCE UR: (no result)
APTT PPP: YELLOW S
AST SERPL-CCNC: 29 U/L (ref 14–36)
BASOPHILS # BLD AUTO: 0 10^3/UL (ref 0–0.2)
BASOPHILS NFR BLD AUTO: 0.2 % (ref 0–2)
BILIRUB DIRECT SERPL-MCNC: 0.2 MG/DL (ref 0–0.4)
BILIRUB SERPL-MCNC: 0.4 MG/DL (ref 0.2–1.3)
BILIRUB UR QL STRIP: NEGATIVE
BUN SERPL-MCNC: 18 MG/DL (ref 7–20)
CALCIUM: 9.5 MG/DL (ref 8.4–10.2)
CHLORIDE SERPL-SCNC: 107 MMOL/L (ref 98–107)
CK SERPL-CCNC: 125 U/L (ref 30–135)
CO2 SERPL-SCNC: 28 MMOL/L (ref 22–30)
EOSINOPHIL # BLD AUTO: 0 10^3/UL (ref 0–0.6)
EOSINOPHIL NFR BLD AUTO: 0.1 % (ref 0–6)
ERYTHROCYTE [DISTWIDTH] IN BLOOD BY AUTOMATED COUNT: 13.1 % (ref 11.5–14)
FREE T4 (FREE THYROXINE): 1.65 NG/DL (ref 0.78–2.19)
GLUCOSE SERPL-MCNC: 133 MG/DL (ref 75–110)
GLUCOSE UR STRIP-MCNC: NEGATIVE MG/DL
HCT VFR BLD CALC: 34.5 % (ref 36–47)
HGB BLD-MCNC: 11.3 G/DL (ref 12–15.5)
KETONES UR STRIP-MCNC: NEGATIVE MG/DL
LYMPHOCYTES # BLD AUTO: 0.9 10^3/UL (ref 0.5–4.7)
LYMPHOCYTES NFR BLD AUTO: 6 % (ref 13–45)
MCH RBC QN AUTO: 30.6 PG (ref 27–33.4)
MCHC RBC AUTO-ENTMCNC: 32.9 G/DL (ref 32–36)
MCV RBC AUTO: 93 FL (ref 80–97)
MONOCYTES # BLD AUTO: 0.9 10^3/UL (ref 0.1–1.4)
MONOCYTES NFR BLD AUTO: 6.2 % (ref 3–13)
NEUTROPHILS # BLD AUTO: 12.8 10^3/UL (ref 1.7–8.2)
NEUTS SEG NFR BLD AUTO: 87.5 % (ref 42–78)
NITRITE UR QL STRIP: NEGATIVE
PH UR STRIP: 6 [PH] (ref 5–9)
PLATELET # BLD: 214 10^3/UL (ref 150–450)
POTASSIUM SERPL-SCNC: 4.1 MMOL/L (ref 3.6–5)
PROT SERPL-MCNC: 6.8 G/DL (ref 6.3–8.2)
PROT UR STRIP-MCNC: NEGATIVE MG/DL
RBC # BLD AUTO: 3.71 10^6/UL (ref 3.72–5.28)
SP GR UR STRIP: 1.01
T3FREE SERPL-MCNC: 3.59 PG/ML (ref 2.77–5.27)
TOTAL CELLS COUNTED % (AUTO): 100 %
UROBILINOGEN UR-MCNC: NEGATIVE MG/DL (ref ?–2)
WBC # BLD AUTO: 14.6 10^3/UL (ref 4–10.5)

## 2019-07-20 PROCEDURE — 36415 COLL VENOUS BLD VENIPUNCTURE: CPT

## 2019-07-20 PROCEDURE — 80053 COMPREHEN METABOLIC PANEL: CPT

## 2019-07-20 PROCEDURE — 84484 ASSAY OF TROPONIN QUANT: CPT

## 2019-07-20 PROCEDURE — S0164 INJECTION PANTROPRAZOLE: HCPCS

## 2019-07-20 PROCEDURE — 87186 SC STD MICRODIL/AGAR DIL: CPT

## 2019-07-20 PROCEDURE — 81001 URINALYSIS AUTO W/SCOPE: CPT

## 2019-07-20 PROCEDURE — 72125 CT NECK SPINE W/O DYE: CPT

## 2019-07-20 PROCEDURE — 70551 MRI BRAIN STEM W/O DYE: CPT

## 2019-07-20 PROCEDURE — 51701 INSERT BLADDER CATHETER: CPT

## 2019-07-20 PROCEDURE — 93010 ELECTROCARDIOGRAM REPORT: CPT

## 2019-07-20 PROCEDURE — 85025 COMPLETE CBC W/AUTO DIFF WBC: CPT

## 2019-07-20 PROCEDURE — 99285 EMERGENCY DEPT VISIT HI MDM: CPT

## 2019-07-20 PROCEDURE — 87040 BLOOD CULTURE FOR BACTERIA: CPT

## 2019-07-20 PROCEDURE — 87088 URINE BACTERIA CULTURE: CPT

## 2019-07-20 PROCEDURE — 70450 CT HEAD/BRAIN W/O DYE: CPT

## 2019-07-20 PROCEDURE — 84481 FREE ASSAY (FT-3): CPT

## 2019-07-20 PROCEDURE — 93005 ELECTROCARDIOGRAM TRACING: CPT

## 2019-07-20 PROCEDURE — 72192 CT PELVIS W/O DYE: CPT

## 2019-07-20 PROCEDURE — 87086 URINE CULTURE/COLONY COUNT: CPT

## 2019-07-20 PROCEDURE — 82550 ASSAY OF CK (CPK): CPT

## 2019-07-20 PROCEDURE — 84443 ASSAY THYROID STIM HORMONE: CPT

## 2019-07-20 PROCEDURE — 84439 ASSAY OF FREE THYROXINE: CPT

## 2019-07-20 RX ADMIN — HEPARIN SODIUM SCH UNIT: 5000 INJECTION, SOLUTION INTRAVENOUS; SUBCUTANEOUS at 14:39

## 2019-07-20 RX ADMIN — CEPHALEXIN ONE: 500 CAPSULE ORAL at 10:21

## 2019-07-20 RX ADMIN — DOCUSATE SODIUM SCH: 100 CAPSULE, LIQUID FILLED ORAL at 18:14

## 2019-07-20 RX ADMIN — SODIUM CHLORIDE PRN MLS/HR: 9 INJECTION, SOLUTION INTRAVENOUS at 14:40

## 2019-07-20 RX ADMIN — HEPARIN SODIUM SCH UNIT: 5000 INJECTION, SOLUTION INTRAVENOUS; SUBCUTANEOUS at 22:51

## 2019-07-20 RX ADMIN — CEPHALEXIN ONE MG: 500 CAPSULE ORAL at 10:07

## 2019-07-20 RX ADMIN — SIMVASTATIN SCH: 10 TABLET, FILM COATED ORAL at 22:48

## 2019-07-20 NOTE — RADIOLOGY REPORT (SQ)
EXAM DESCRIPTION:  CT HEAD WITHOUT



COMPLETED DATE/TIME:  7/20/2019 7:46 am



REASON FOR STUDY:  fall down stairs, dementia, non-verbal



COMPARISON:  10/8/2013.



TECHNIQUE:  Axial images acquired through the brain without intravenous contrast.  Images reviewed wi
th bone, brain and subdural windows.  Additional sagittal and coronal reconstructions were generated.
 Images stored on PACS.

All CT scanners at this facility use dose modulation, iterative reconstruction, and/or weight based d
osing when appropriate to reduce radiation dose to as low as reasonably achievable (ALARA).

[facility]: Dose Right  [facility]: CareDose    [facility]: Dose Right    [facility]: Teradose 4D    [facility]: Smart Black coin



RADIATION DOSE:  CT Rad equipment meets quality standard of care and radiation dose reduction techniq
ues were employed. CTDIvol: 53.2 mGy. DLP: 937 mGy-cm.mGy.



LIMITATIONS:  None.



FINDINGS:  VENTRICLES: Prominent.

CEREBRUM: No masses.  No hemorrhage.  No midline shift.  Areas of low density in the white matter mos
t likely due to chronic micro-vascular ischemic change.  No evidence for acute infarction.

CEREBELLUM: No masses.  No hemorrhage.  No alteration of density.  No evidence for acute infarction.

EXTRAAXIAL SPACES: Age-related involutional change.  No fluid collections.  No masses.

ORBITS AND GLOBE: No intra- or extraconal masses.  Normal contour of globe without masses.

CALVARIUM: No fracture.

PARANASAL SINUSES: No fluid or mucosal thickening.

SOFT TISSUES: No mass or hematoma.

OTHER: No other significant finding.



IMPRESSION:  CHRONIC CHANGES OF ATROPHY AND MICROVASCULAR ISCHEMIA.  NO ACUTE PROCESS.

EVIDENCE OF ACUTE STROKE: NO.



TECHNICAL DOCUMENTATION:  JOB ID:  [id number]

Quality ID # 436: Final reports with documentation of one or more dose reduction techniques (e.g., Au
tomated exposure control, adjustment of the mA and/or kV according to patient size, use of iterative 
reconstruction technique)

 2011 [employer]- All Rights Reserved



Reading location - IP/workstation name: ANNE MARIE

## 2019-07-20 NOTE — RADIOLOGY REPORT (SQ)
EXAM DESCRIPTION:  CT CERVICAL SPINE WITHOUT



COMPLETED DATE/TIME:  7/20/2019 7:46 am



REASON FOR STUDY:  fall down stairs, dementia, non-verbal



COMPARISON:  None.



TECHNIQUE:  Axial images acquired through the cervical spine without intravenous contrast.  Images re
viewed with lung, soft tissue and bone windows.  Reconstructed coronal and sagittal MPR images review
ed.  Images stored on PACS.

All CT scanners at this facility use dose modulation, iterative reconstruction, and/or weight based d
osing when appropriate to reduce radiation dose to as low as reasonably achievable (ALARA).

[facility]: Dose Right  [facility]: CareDose    [facility]: Dose Right    [facility]: Teradose 4D    [facility]: Smart Technologies



RADIATION DOSE:  CT Rad equipment meets quality standard of care and radiation dose reduction techniq
ues were employed. CTDIvol: 7.4 mGy. DLP: 160 mGy-cm. mGy.



LIMITATIONS:  None.



FINDINGS:  ALIGNMENT: Anatomic.

MINERALIZATION: Normal.

VERTEBRAL BODIES: No fractures or dislocation.

DISCS: Multilevel disc space narrowing with osteophytes.

FACETS, LATERAL MASSES, POSTERIOR ELEMENTS: Facet arthropathy.  No fractures.  No dislocation.  No ac
kali findings.

HARDWARE: None in the spine.

VISUALIZED RIBS: No fractures.

LUNG APICES AND SOFT TISSUES: No significant or acute findings.

OTHER: No other significant finding.



IMPRESSION:  CHRONIC DEGENERATIVE CHANGES.  NO ACUTE FINDINGS.



TECHNICAL DOCUMENTATION:  JOB ID:  [id number]

Quality ID # 436: Final reports with documentation of one or more dose reduction techniques (e.g., Au
tomated exposure control, adjustment of the mA and/or kV according to patient size, use of iterative 
reconstruction technique)

 2011 [employer]- All Rights Reserved



Reading location - IP/workstation name: ANNE MARIE

## 2019-07-20 NOTE — PDOC H&P
History of Present Illness


Admission Date/PCP: 


  





  ANNE-MARIE FRAZIER MD





Patient complains of: Multiple falls with a fall down a flight of stairs this 

morning


History of Present Illness: 


KIRK BRANDT is a 74 year old female with advanced dementia.  She has a 

history of hypothyroidism and peptic ulcer disease but most prominently is her 

very advanced dementia.  Her  reports that she has had an unsteady gait 

for approximately a month.  He notices that when she ambulates she tends to be 

wobbly.  Is not specifically to any one side.  He states that she has fallen in 

the past but never down a flight of stairs.  He states that the patient was 

going to go downstairs.  He heard her fall.  When he rushed to her side she was 

at the bottom of a flight of 7 stairs.  The stairs were carpeted.  She was on 

the floor with her head against the wall.  He did not see any abrasions or 

lacerations.  He did say that she was not speaking and had a gurgling type 

breath sound.  Her eyes were wide open.  He went to call EMS and when he got 

back she had started talking again.  EMS then transported her to the emergency 

department.  On evaluation she does have white blood cells and trace leukocyte 

esterase in her urine.  Imaging did not reveal any acute injuries such as garcia

bdural hematoma or spinal compression fractures.  She may likely have suffered a

concussion.  With her presentation when her  first saw her concern for 

seizure activity is also present.  She was referred to the hospitalist service 

for admission.








Past Medical History


Cardiac Medical History: Reports: Hyperlipidema


   Denies: Coronary Artery Disease, Myocardial Infarction, Hypertension


Pulmonary Medical History: 


   Denies: Asthma, Bronchitis, Chronic Obstructive Pulmonary Disease (COPD), 

Pneumonia


EENT Medical History: 


   Denies: Cataracts, Ears, Nose


Neurological Medical History: Reports: Other - Dementia


   Denies: Hemorrhagic CVA, Seizures


Endocrine Medical History: Reports: Diabetes Mellitus Type 2, Hypothyroidism


Renal/ Medical History: 


   Denies: Chronic Kidney Disease


Malignancy Medical History: Reports: None


GI Medical History: Reports: Gastroesophageal Reflux Disease, Peptic Ulcer 

Disease


Musculoskeltal Medical History: Reports: Arthritis


Skin Medical History: 


   Denies: Eczema, Psoriasis


Psychiatric Medical History: Reports: Dementia


Traumatic Medical History: Reports: None


Hematology: 


   Denies: Anemia


Infectious Medical History: Reports: None





Past Surgical History


Past Surgical History: Reports: Appendectomy, Hysterectomy, Other - Upper 

endoscopy





Social History


Information Source: Relative - Her , [facility] Records


Lives with: Spouse/Significant other


Smoking Status: Never Smoker


Frequency of Alcohol Use: None


Hx Recreational Drug Use: No


Drugs: Cocaine


Hx Prescription Drug Abuse: No





- Advance Directive


Resuscitation Status: Do Not Resuscitate


Surrogate healthcare decision maker:: 





Both the patient and her  have living guillen.  The patient's  

states that he has brought them to the hospital before and should be on record.





Family History


Family History: CAD, Other - Dementia


Parental Family History Reviewed: Yes - Mother with dementia Father with heart 

disease


Children Family History Reviewed: Yes


Sibling(s) Family History Reviewed.: Yes





Medication/Allergy


Home Medications: 








Calcium Carb/Vitamin D3/Vit K1 [Calcium + D Soft Chewable Tab] 500 cap PO TID 

12/21/16 


Docusate Sodium [Colace 100 mg Capsule] 100 mg PO BID 12/21/16 


Ferrous Sulfate [Albafort] 325 mg PO DAILY 12/21/16 


Folic Acid 1 mg PO DAILY 12/21/16 


Levothyroxine Sodium [Synthroid 0.088 mg Tablet] 88 mcg PO DAILY 12/21/16 


Simvastatin [Zocor 20 mg Tablet] 20 mg PO DAILY 12/21/16 


Tramadol HCl 50 mg PO ASDIR PRN 10/24/17 


Cholecalciferol (Vitamin D3) [Vitamin D3 400 Unit Tablet] 2 tab PO DAILY 

07/20/19 


Cyanocobalamin (Vitamin B-12) [Vitamin B-12] 1,000 mcg PO DAILY 07/20/19 


Omeprazole Magnesium [Prilosec Otc] 40 mg PO DAILY 07/20/19 








Allergies/Adverse Reactions: 


                                        





ciprofloxacin Adverse Reaction (Mild, Verified 07/20/19 06:50)


   STOMACH UPSET











Review of Systems


ROS unobtainable: Due to mental status


Review of Systems: 





Some information gathered from the patient's 


Constitutional: PRESENT: weight loss - The patient states that his wife has 

experienced some weight loss over the last several months.  Her weight appears t

o have stabilized per her ., other - Frail with generalized weakness


Nose, Mouth, and Throat: PRESENT: other - The patient did complain of pain 

across the left cheek several centimeters below the eye and radiating towards 

the ear.  She rubbed her hand along her face several times.  I did not 

appreciate any swelling, contusions or abrasions.


Neurological: PRESENT: abnormal gait, abnormal speech - Almost a phasic, 

frequent falls, other - Advanced dementia





Physical Exam


Vital Signs: 


                                        











Temp Pulse Resp BP Pulse Ox


 


 98.6 F   61   13   134/63 H  99 


 


 07/20/19 06:47  07/20/19 06:47  07/20/19 06:47  07/20/19 06:47  07/20/19 06:47








                                 Intake & Output











 07/19/19 07/20/19 07/21/19





 06:59 06:59 06:59


 


Weight  50.802 kg 











General appearance: PRESENT: mild distress, thin, other - Patient with extremely

limited verbal interaction.  Hardly formulated any words to try and answer 

questions.


Head exam: PRESENT: atraumatic, normocephalic, other - Despite pointing out the 

area on the left side of her face that I believe she was indicating was painful 

there were no contusions, abrasions and no swelling.  There was no supple 

conjunctival hemorrhage.


Eye exam: PRESENT: conjunctiva pale.  ABSENT: conjunctival injection, EOMI - The

patient was unable to track my finger.  It is more likely a comprehension issue 

rather than true oculomotor muscle dysfunction., scleral icterus


Ear exam: PRESENT: normal external ear exam


Mouth exam: PRESENT: dry mucosa, tongue midline - The patient did struggle to 

stick out her tongue.


Teeth exam: PRESENT: other - Unable to assess


Throat exam: PRESENT: other - Unable to assess


Neck exam: ABSENT: JVD, lymphadenopathy, thyromegaly, tracheostomy


Respiratory exam: PRESENT: clear to auscultation prince, symmetrical, unlabored.  

ABSENT: accessory muscle use, rales, rhonchi, tachypnea, wheezes


Cardiovascular exam: PRESENT: RRR, +S1, +S2, other - No additional heart sounds


Pulses: PRESENT: normal radial pulses, normal dorsalis pedis pul


GI/Abdominal exam: PRESENT: normal bowel sounds, soft, tenderness - Patient did 

report tenderness across the suprapubic area..  ABSENT: distended, guarding, 

organolmegaly


Rectal exam: PRESENT: deferred


Gentrourinary exam: ABSENT: indwelling catheter


Extremities exam: ABSENT: joint swelling, pedal edema


Musculoskeletal exam: PRESENT: other - decreased muscle mass


Neurological exam: PRESENT: altered - She appears to have difficulty following 

commands.  Her, awake, oriented to person - She appears to be oriented to person

but not answer any further questions.  ABSENT: oriented to place, oriented to 

time, oriented to situation


Psychiatric exam: PRESENT: flat affect.  ABSENT: agitated, anxious


Focused psych exam: ABSENT: delusional, psychomotor agitation, restlessness


Skin exam: PRESENT: dry, normal color, warm.  ABSENT: rash





Results


Laboratory Results: 


                                        





                                 07/20/19 08:01 





                                 07/20/19 08:01 





                                        











  07/20/19 07/20/19 07/20/19





  08:01 08:01 08:50


 


WBC  14.6 H  


 


RBC  3.71 L  


 


Hgb  11.3 L  


 


Hct  34.5 L  


 


MCV  93  


 


MCH  30.6  


 


MCHC  32.9  


 


RDW  13.1  


 


Plt Count  214  


 


Seg Neutrophils %  87.5 H  


 


Lymphocytes %  6.0 L  


 


Monocytes %  6.2  


 


Eosinophils %  0.1  


 


Basophils %  0.2  


 


Absolute Neutrophils  12.8 H  


 


Absolute Lymphocytes  0.9  


 


Absolute Monocytes  0.9  


 


Absolute Eosinophils  0.0  


 


Absolute Basophils  0.0  


 


Sodium   143.0 


 


Potassium   4.1 


 


Chloride   107 


 


Carbon Dioxide   28 


 


Anion Gap   8 


 


BUN   18 


 


Creatinine   0.83 


 


Est GFR ( Amer)   > 60 


 


Est GFR (Non-Af Amer)   > 60 


 


Glucose   133 H 


 


Calcium   9.5 


 


Total Bilirubin   0.4 


 


AST   29 


 


ALT   33 


 


Alkaline Phosphatase   61 


 


Total Protein   6.8 


 


Albumin   3.8 


 


Urine Color    YELLOW


 


Urine Appearance    SLIGHTLY-CLOUDY


 


Urine pH    6.0


 


Ur Specific Gravity    1.013


 


Urine Protein    NEGATIVE


 


Urine Glucose (UA)    NEGATIVE


 


Urine Ketones    NEGATIVE


 


Urine Blood    SMALL H


 


Urine Nitrite    NEGATIVE


 


Ur Leukocyte Esterase    TRACE H


 


Urine WBC (Auto)    42


 


Urine RBC (Auto)    2








                                        











  07/20/19 07/20/19





  08:01 08:01


 


Creatine Kinase  125 


 


Troponin I   < 0.012











Impressions: 


                                        





Cervical Spine CT  07/20/19 07:18


IMPRESSION:  CHRONIC DEGENERATIVE CHANGES.  NO ACUTE FINDINGS.


 








Head CT  07/20/19 07:18


IMPRESSION:  CHRONIC CHANGES OF ATROPHY AND MICROVASCULAR ISCHEMIA.  NO ACUTE 

PROCESS.


EVIDENCE OF ACUTE STROKE: NO.


 








Hip/Pelvis X-Ray  07/20/19 07:18


IMPRESSION:  NEGATIVE STUDY OF THE RIGHT HIP. NO RADIOGRAPHIC EVIDENCE OF ACUTE 

INJURY.


 














Assessment and Plan





- Diagnosis


(1) Urinary tract infection


Qualifiers: 


   Urinary tract infection type: acute cystitis   Hematuria presence: without 

hematuria   Qualified Code(s): N30.00 - Acute cystitis without hematuria   


Is this a current diagnosis for this admission?: Yes   


Plan: 


7/20/2019-urinalysis reveals greater than 48 white blood cells per high-power 

field.  She was trace leukocyte esterase positive.  An elderly patient with 

cystitis could certainly have increased falls.  With her dementia she might not 

be able to convey or appreciate dysuria.  When the nurse tried to give her oral 

antibiotic the patient did not know how to use a straw to take water so her oral

route was changed to intravenous.  Urine culture is pending.  Await results.  We

will narrow the spectrum of antibiotic therapy once those results are available.








(2) Concussion


Qualifiers: 


   Encounter type: initial encounter 


Is this a current diagnosis for this admission?: Yes   


Plan: 


7/20/2019-because the patient has the severe dementia she was unable to convey 

to her  whether or not she experienced loss of consciousness.  The 

 did run to her aid fairly quickly and upon arrival she did have her eyes

open.  He did describe an almost altered state such as being post ictal.  She 

has no history of seizure disorder.  He has not witnessed any seizures in the 

home in the past.  The most likely etiology is hitting her head against the 

wall.  She may have experienced transient loss of consciousness and could very 

well have a concussion.  Because she is unable to accurately convey symptoms in 

a timely fashion we will observe her and perform neuro checks.  These will be 

impaired by her underlying dementia.  Most importantly we will see if there is a

change from presentation on admission.  Per the  she typically is more 

interactive than she is during this encounter.








(3) Fall


Qualifiers: 


   Encounter type: initial encounter   Qualified Code(s): W19.XXXA - Unspecified

fall, initial encounter   


Is this a current diagnosis for this admission?: Yes   


Plan: 


7/20/2019-the patient has been having unsteady gait for probably a month 

according to the patient's .  There is no evidence of acute infarct by CT

scan of the head but rather chronic ischemic changes.  She does have prominent 

ventricles.  Hydrocephalus is a potential contributing factor.  Her advanced 

dementia, however, is the most likely cause of her imbalance.  I will have 

physical therapy work with her.  We will see if she might benefit from a walker 

for safety and possibly work on strength to help with her gait.








(4) Leukocytosis


Qualifiers: 


   Leukocytosis type: unspecified   Qualified Code(s): D72.829 - Elevated white 

blood cell count, unspecified   


Is this a current diagnosis for this admission?: Yes   


Plan: 


7/20/2019-the patient did have an elevated white blood cell count of 14.6.  

Oftentimes elderly patients will not mount a significant white count or 

temperature with underlying infections.  I will repeat a CBC in the morning and 

as noted above the urine culture is pending.








(5) Diabetes mellitus type II, controlled


Qualifiers: 


   Diabetes mellitus long term insulin use: without long term use   Diabetes 

mellitus complication status: without complication   Qualified Code(s): E11.9 - 

Type 2 diabetes mellitus without complications   


Is this a current diagnosis for this admission?: Yes   


Plan: 


7/20/2019-the patient's  performs Accu-Cheks.  He states that they are 

typically between 101 150.  She is on no medications but he does try to maintain

a reasonable diabetic diet.  At this time I will not order hemoglobin A1c 

especially because her serum glucose at the time of admission was only 133.








(6) Dementia


Qualifiers: 


   Dementia type: Alzheimer's disease   Alzheimer's disease onset: unspecified 

onset   Dementia behavioral disturbance: without behavioral disturbance   

Qualified Code(s): G30.9 - Alzheimer's disease, unspecified; F02.80 - Dementia 

in other diseases classified elsewhere without behavioral disturbance   


Is this a current diagnosis for this admission?: Yes   


Plan: 


7/20/2019-the patient is not on any medications such as Namenda or Aricept.  Her

dementia is advanced.  It may be the primary cause of her falls.  Additionally 

when the emergency department nurse went to give her first dose of oral ant

ibiotic the patient did not seem to comprehend had to use a straw so the 

antibiotic route was changed to IV.  I did write for a diet but I asked that 

they do a bedside swallow eval first and if she passes then go ahead and order 

food.  I have asked physical therapy to see the patient as noted above.  In 

addition I have asked speech therapy and Occupational Therapy to see the 

patient.   states that he typically prepares all of the food but tries to

get her to feed herself at least once a day.  I am not sure how successful that 

is.  She definitely needs evaluation by these 2 services as well.  Because of 

the advanced stage of her dementia I also talked to the  about palliative

care/hospice consult.








(7) Dysphagia, pharyngeal phase


Is this a current diagnosis for this admission?: Yes   


Plan: 


7/20/2019-as noted above I have some concern about dysphasia.  In March 2017 she

had a modified barium swallow with slight penetration of thin liquids.  I will 

go ahead and change her diet to nectar thick liquids.  Await speech therapies 

recommendations.  The patient may even need assistance with meals.








- Time


Time Spent with patient: 35 or more minutes


Medications reviewed and adjusted accordingly: Yes





- Inpatient Certification


Based on my medical assessment, after consideration of the patient's 

comorbidities, presenting symptoms, or acuity I expect that the services needed 

warrant INPATIENT care.: Yes


I certify that my determination is in accordance with my understanding of 

Medicare's requirements for reasonable and necessary INPATIENT services [42 CFR 

412.3e].: Yes


Medical Necessity: Need For IV Fluids, Need for Neurological Checks, Need for IV

Antibiotics


Post Hospital Care: D/C Planner Documentation

## 2019-07-20 NOTE — EKG REPORT
SEVERITY:- ABNORMAL ECG -

SINUS RHYTHM

LATERAL INFARCT, OLD

:

Confirmed by: Genaro Curiel MD 20-Jul-2019 18:04:30

## 2019-07-20 NOTE — RADIOLOGY REPORT (SQ)
EXAM DESCRIPTION:  HIP RIGHT AP/LATERAL



COMPLETED DATE/TIME:  7/20/2019 7:55 am



REASON FOR STUDY:  fall down stairs, dementia, non-verbal



COMPARISON:  None.



NUMBER OF VIEWS:  Two views.



TECHNIQUE:  AP pelvis and additional frog-leg view of the right hip.



LIMITATIONS:  None.



FINDINGS:  MINERALIZATION: Normal.

RIGHT HIP: No fracture or dislocation.  No worrisome bone lesions.

LEFT HIP: No fracture or dislocation.  No worrisome bone lesions.

PUBIS AND ISCHIUM: No fracture.

PELVIS: No fracture.

SACRUM: No fracture or dislocation. No worrisome bone lesions.

LOWER LUMBAR SPINE: No fracture or dislocation. No worrisome bone lesions.  Degenerative disc disease
.

SOFT TISSUES: No findings.

OTHER: No other significant finding.



IMPRESSION:  NEGATIVE STUDY OF THE RIGHT HIP. NO RADIOGRAPHIC EVIDENCE OF ACUTE INJURY.



TECHNICAL DOCUMENTATION:  JOB ID:  [id number]

 2011 [employer]- All Rights Reserved



Reading location - IP/workstation name: ANNE MARIE

## 2019-07-20 NOTE — ADVANCED CARE
- Diagnosis


(1) Urinary tract infection


Diagnosis Current: Yes   





(2) Concussion


Diagnosis Current: Yes   





(3) Fall


Diagnosis Current: Yes   





(4) Leukocytosis


Diagnosis Current: Yes   





(5) Diabetes mellitus type II, controlled


Diagnosis Current: Yes   





(6) Dementia


Diagnosis Current: Yes   





(7) Dysphagia, pharyngeal phase


Diagnosis Current: Yes   


Attendance: 





The patient's  and I had this discussion at the bedside.


Resuscitation Status: Do Not Resuscitate


Discussion: 


The patient has advanced dementia.  It is becoming more difficult for her 

 to take care of her.  He has disability because of a bad back.  He is 

extremely concerned because she has been falling more.  Today she fell down a 

flight of stairs.  He has already filed paperwork with the [facility] however he is the 

primary insured not her.  We discussed the current state of affairs and he is in

agreement with a palliative care/hospice consult.  We are having physical 

therapy, Occupational Therapy and speech evaluate the patient.  They will provi

de a baseline evaluation.  I suspect a concussion is present because she hit her

head on the wall and hopefully her current condition will improve slightly.  

Based on our discussion it sounds like her condition is quite advanced.


Care Planning Goals: 


From our discussion I believe the goals would be long-term placement.  It does 

not appear that he is able to care for her at home by himself.  Does not appear 

that there are family members that would be able to provide enough support at 

this point.  We will have a palliative care consult.  We will see how the 

patient recovers over the next day or 2.  The patient may require long-term 

placement in a memory care unit.


Document(s) Completed: 





None.  The patient already has documentation that he believes is on file at this

hospital.


Time Spent: 25 minutes

## 2019-07-20 NOTE — ER DOCUMENT REPORT
Entered by JESÚS CHISHOLM SCRIBE  07/20/19 0718 





Acting as scribe for:HERMELINDA ORTIZ MD





ED Fall





- General


Chief Complaint: Fall


Stated Complaint: FALL


Time Seen by Provider: 07/20/19 07:08


Information source: Relative - 


Cannot obtain history due to: Dementia


Notes: 





Patient is a 74 year old female that presents to the emergency department today 

with complaints of a fall down seven steps just prior to arrival according to 

the patient's  at bedside. All history is being given by the patient's 

 as the patient is nonverbal at baseline secondary to dementia.  

states that the patient is able to ambulate on her own at baseline.  st

ates that the patient got out of bed this morning to go to the restroom and he 

"heard her fall".  states that he found the patient lying on the landing 

of their steps with her head pressed up against the wall.   states that 

the patient seemed to have some "discomfort in one of her hips, I think the 

left?" the  stated. History is limited secondary to the patient's 

dementia.


TRAVEL OUTSIDE OF THE [place] IN LAST 30 DAYS: No





- Related data


Allergies/Adverse Reactions: 


                                        





ciprofloxacin Adverse Reaction (Mild, Verified 07/20/19 06:50)


   STOMACH UPSET











Past Medical History





- General


Information source: Relative - , [facility] Records


Cannot obtain history due to: Dementia





- Social History


Smoking Status: Never Smoker


Cigarette use (# per day): No


Chew tobacco use (# tins/day): No


Frequency of alcohol use: None


Drug Abuse: None


Lives with: Spouse/Significant other


Family History: Reviewed & Not Pertinent


Patient has suicidal ideation: No


Patient has homicidal ideation: No





- Past Medical History


Cardiac Medical History: Reports: Hx Hypercholesterolemia


Endocrine Medical History: Reports: Hx Diabetes Mellitus Type 2


GI Medical History: Reports: Hx Gastroesophageal Reflux Disease


Musculoskeletal Medical History: Reports Hx Arthritis


Psychiatric Medical History: Reports: Hx Dementia


Past Surgical History: Reports: Hx Appendectomy, Hx Hysterectomy





- Immunizations


Hx Diphtheria, Pertussis, Tetanus Vaccination: Yes


Hx Pneumococcal Vaccination: 01/01/12





Review of Systems





- Review of Systems


-: Yes ROS unobtainable due to patient's medical condition - dementia, non-

verbal





Physical Exam





- Vital signs


Vitals: 


                                        











Temp Pulse Resp BP Pulse Ox


 


 98.6 F   61   13   134/63 H  99 


 


 07/20/19 06:47  07/20/19 06:47  07/20/19 06:47  07/20/19 06:47  07/20/19 06:47














- Notes


Notes: 





Physical Exam:


 


General: Patient lies expressionless during exam consistent with diagnosed demen

tia.  Nonverbal which is baseline according to the .


 


HEENT: Normocephalic. Atraumatic. PERRL. Extraocular movements intact. 

Oropharynx clear.


 


Neck: Palpation of the neck and posterior cervical muscles appears to cause some

discomfort as the patient grimaces. 





Respiratory: No respiratory distress. Clear and equal breath sounds bilaterally.


 


Cardiovascular: Regular rate and rhythm. 


 


Abdominal: Normal Inspection. Non-tender. No distension. Normal Bowel Sounds. 


 


Back: Non-tender. No deformity or step off.


  


Upper extremities: Right elbow held in extreme flexion, difficulty when a

ttempting to straighten it.  Once elbow is extended, patient does use it without

any apparent pain. There is no discomfort with palpation/movment of all other 

upper extremity joints including wrist and shoulders. 





Lower extremities: Internal/External rotation and Flexion/Extension of left hip 

performed without any apparent discomfort. Right hip resists flexion, there is 

some difficulty with fully flexing the right hip as well however the patient 

remains expressionless during this.





Neurological: Non-verbal. 


 


Skin: Warm. Dry. Normal color.





Course





- Vital Signs


Vital signs: 


                                        











Temp Pulse Resp BP Pulse Ox


 


 98.6 F   61   13   134/63 H  99 


 


 07/20/19 06:47  07/20/19 06:47  07/20/19 06:47  07/20/19 06:47  07/20/19 06:47














- Laboratory


Result Diagrams: 


                                 07/20/19 08:01





                                 07/20/19 08:01


Laboratory results interpreted by me: 


                                        











  07/20/19 07/20/19 07/20/19





  08:01 08:01 08:50


 


WBC  14.6 H  


 


RBC  3.71 L  


 


Hgb  11.3 L  


 


Hct  34.5 L  


 


Seg Neutrophils %  87.5 H  


 


Lymphocytes %  6.0 L  


 


Absolute Neutrophils  12.8 H  


 


Glucose   133 H 


 


Urine Blood    SMALL H


 


Ur Leukocyte Esterase    TRACE H














- Diagnostic Test


Radiology reviewed: Image reviewed, Reports reviewed - CT scans of the head and 

cervical spine showed chronic degenerative changes with an acute finding.  Right

hip x-ray is unremarkable.





- Consults


  ** Dr. Fontanez


Time consulted: 09:48


Consulted provider: will come to ER





Discharge





- Discharge


Clinical Impression: 


Fall


Qualifiers:


 Encounter type: initial encounter Qualified Code(s): W19.XXXA - Unspecified 

fall, initial encounter





Urinary tract infection


Qualifiers:


 Urinary tract infection type: site unspecified Hematuria presence: without 

hematuria Qualified Code(s): N39.0 - Urinary tract infection, site not specified





Leukocytosis


Qualifiers:


 Leukocytosis type: unspecified Qualified Code(s): D72.829 - Elevated white 

blood cell count, unspecified





Dementia


Qualifiers:


 Dementia type: Alzheimer's disease Alzheimer's disease onset: unspecified onset

Dementia behavioral disturbance: without behavioral disturbance Qualified 

Code(s): G30.9 - Alzheimer's disease, unspecified





Condition: Stable


Disposition: ADMITTED AS INPATIENT


Admitting Provider: Huseyin (Hospitalist)


Lionibe Attestation: 





07/20/19 08:28


I personally performed the services described in the documentation, reviewed and

edited the documentation which was dictated to the scribe in my presence, and it

accurately records my words and actions.





I personally performed the services described in the documentation, reviewed and

edited the documentation which was dictated to the scribe in my presence, and it

accurately records my words and actions.

## 2019-07-21 LAB
ADD MANUAL DIFF: NO
BASOPHILS # BLD AUTO: 0 10^3/UL (ref 0–0.2)
BASOPHILS NFR BLD AUTO: 0.3 % (ref 0–2)
EOSINOPHIL # BLD AUTO: 0 10^3/UL (ref 0–0.6)
EOSINOPHIL NFR BLD AUTO: 0 % (ref 0–6)
ERYTHROCYTE [DISTWIDTH] IN BLOOD BY AUTOMATED COUNT: 13.5 % (ref 11.5–14)
HCT VFR BLD CALC: 34.2 % (ref 36–47)
HGB BLD-MCNC: 11.5 G/DL (ref 12–15.5)
LYMPHOCYTES # BLD AUTO: 1.6 10^3/UL (ref 0.5–4.7)
LYMPHOCYTES NFR BLD AUTO: 15.2 % (ref 13–45)
MCH RBC QN AUTO: 30.9 PG (ref 27–33.4)
MCHC RBC AUTO-ENTMCNC: 33.5 G/DL (ref 32–36)
MCV RBC AUTO: 92 FL (ref 80–97)
MONOCYTES # BLD AUTO: 0.9 10^3/UL (ref 0.1–1.4)
MONOCYTES NFR BLD AUTO: 8.5 % (ref 3–13)
NEUTROPHILS # BLD AUTO: 8 10^3/UL (ref 1.7–8.2)
NEUTS SEG NFR BLD AUTO: 76 % (ref 42–78)
PLATELET # BLD: 197 10^3/UL (ref 150–450)
RBC # BLD AUTO: 3.71 10^6/UL (ref 3.72–5.28)
TOTAL CELLS COUNTED % (AUTO): 100 %
WBC # BLD AUTO: 10.6 10^3/UL (ref 4–10.5)

## 2019-07-21 RX ADMIN — FERROUS SULFATE TAB 325 MG (65 MG ELEMENTAL FE) SCH: 325 (65 FE) TAB at 09:55

## 2019-07-21 RX ADMIN — DOCUSATE SODIUM SCH: 100 CAPSULE, LIQUID FILLED ORAL at 09:55

## 2019-07-21 RX ADMIN — DOCUSATE SODIUM SCH: 100 CAPSULE, LIQUID FILLED ORAL at 17:24

## 2019-07-21 RX ADMIN — CHOLECALCIFEROL TAB 10 MCG (400 UNIT) SCH: 10 TAB at 09:56

## 2019-07-21 RX ADMIN — PANTOPRAZOLE SODIUM SCH MG: 40 INJECTION, POWDER, FOR SOLUTION INTRAVENOUS at 11:11

## 2019-07-21 RX ADMIN — HEPARIN SODIUM SCH: 5000 INJECTION, SOLUTION INTRAVENOUS; SUBCUTANEOUS at 13:29

## 2019-07-21 RX ADMIN — LEVOTHYROXINE SODIUM SCH: 88 TABLET ORAL at 05:13

## 2019-07-21 RX ADMIN — CYANOCOBALAMIN TAB 1000 MCG SCH: 1000 TAB at 09:56

## 2019-07-21 RX ADMIN — SODIUM CHLORIDE PRN MLS/HR: 9 INJECTION, SOLUTION INTRAVENOUS at 05:12

## 2019-07-21 RX ADMIN — FOLIC ACID SCH: 1 TABLET ORAL at 09:55

## 2019-07-21 RX ADMIN — HEPARIN SODIUM SCH UNIT: 5000 INJECTION, SOLUTION INTRAVENOUS; SUBCUTANEOUS at 05:12

## 2019-07-21 RX ADMIN — HEPARIN SODIUM SCH: 5000 INJECTION, SOLUTION INTRAVENOUS; SUBCUTANEOUS at 21:37

## 2019-07-21 RX ADMIN — SIMVASTATIN SCH: 10 TABLET, FILM COATED ORAL at 21:37

## 2019-07-21 NOTE — PDOC PROGRESS REPORT
Subjective


Progress Note for:: 07/21/19


Subjective:: 


This is a 74 year old female with advanced dementia, hypothyroidism, history of 

GI bleed and peptic ulcer disease who presented with worsening gait and 

recurrent falls.  Patient was also noted to be more confused than usual.  She 

was also noted to have urinary tract infection.





No acute event overnight.  This morning she was noted to have questionable 

right-sided weakness.  Stat MRI was done which was negative for an acute CVA.  

 does say that patient has been having worsening confusion and falls at 

home and is looking to placing her in long term care.


Reason For Visit: 


CYSTITIS,MULTIPLE FALLS,DYSPHAGIA,DEMENTIA








Physical Exam


Vital Signs: 


                                        











Temp Pulse Resp BP Pulse Ox


 


 98.6 F   71   18   137/58 H  100 


 


 07/21/19 07:32  07/21/19 07:32  07/21/19 07:32  07/21/19 07:32  07/21/19 07:32








                                 Intake & Output











 07/20/19 07/21/19 07/22/19





 06:59 06:59 06:59


 


Intake Total  1050 


 


Balance  1050 


 


Weight 112 lb 112 lb 3.445 oz 











General appearance: PRESENT: no acute distress, well-developed, well-nourished


Head exam: PRESENT: atraumatic, normocephalic


Eye exam: PRESENT: conjunctiva pink, EOMI, PERRLA.  ABSENT: scleral icterus


Ear exam: PRESENT: normal external ear exam


Mouth exam: PRESENT: moist, tongue midline


Neck exam: ABSENT: carotid bruit, JVD, lymphadenopathy, thyromegaly


Respiratory exam: PRESENT: clear to auscultation prince.  ABSENT: rales, rhonchi, 

wheezes


Cardiovascular exam: PRESENT: RRR.  ABSENT: diastolic murmur, rubs, systolic 

murmur


Pulses: PRESENT: normal dorsalis pedis pul


GI/Abdominal exam: PRESENT: normal bowel sounds, soft.  ABSENT: distended, 

guarding, mass, organolmegaly, rebound, tenderness


Rectal exam: PRESENT: deferred


Neurological exam: PRESENT: alert, awake, CN II-XII grossly intact.  ABSENT: 

oriented to person, oriented to place, oriented to time





Results


Laboratory Results: 


                                        





                                 07/21/19 05:54 





                                 07/20/19 08:01 





                                        











  07/20/19 07/20/19 07/21/19





  08:01 08:01 05:54


 


WBC    10.6 H


 


RBC    3.71 L


 


Hgb    11.5 L


 


Hct    34.2 L


 


MCV    92


 


MCH    30.9


 


MCHC    33.5


 


RDW    13.5


 


Plt Count    197


 


Seg Neutrophils %    76.0


 


Lymphocytes %    15.2


 


Monocytes %    8.5


 


Eosinophils %    0.0


 


Basophils %    0.3


 


Absolute Neutrophils    8.0


 


Absolute Lymphocytes    1.6


 


Absolute Monocytes    0.9


 


Absolute Eosinophils    0.0


 


Absolute Basophils    0.0


 


TSH  0.65  


 


Free T4   1.65 


 


Free T3 pg/mL   3.59 








                                        











  07/20/19 07/20/19





  08:01 08:01


 


Creatine Kinase  125 


 


Troponin I   < 0.012











Impressions: 


                                        





Cervical Spine CT  07/20/19 07:18


IMPRESSION:  CHRONIC DEGENERATIVE CHANGES.  NO ACUTE FINDINGS.


 








Head CT  07/20/19 07:18


IMPRESSION:  CHRONIC CHANGES OF ATROPHY AND MICROVASCULAR ISCHEMIA.  NO ACUTE 

PROCESS.


EVIDENCE OF ACUTE STROKE: NO.


 








Hip/Pelvis X-Ray  07/20/19 07:18


IMPRESSION:  NEGATIVE STUDY OF THE RIGHT HIP. NO RADIOGRAPHIC EVIDENCE OF ACUTE 

INJURY.


 














Assessment and Plan





- Diagnosis


(1) Acute encephalopathy


Is this a current diagnosis for this admission?: Yes   


Plan: 


Multifactorial from UTI and worsening dementia.








(2) Dementia


Qualifiers: 


   Dementia type: Alzheimer's disease   Alzheimer's disease onset: unspecified 

onset   Dementia behavioral disturbance: without behavioral disturbance   

Qualified Code(s): G30.9 - Alzheimer's disease, unspecified; F02.80 - Dementia 

in other diseases classified elsewhere without behavioral disturbance   


Is this a current diagnosis for this admission?: Yes   


Plan: 


Patient has end-stage dementia and is requiring significant care at home.  She 

will need long-term placement.








(3) Urinary tract infection


Qualifiers: 


   Urinary tract infection type: acute cystitis   Hematuria presence: without 

hematuria   Qualified Code(s): N30.00 - Acute cystitis without hematuria   


Is this a current diagnosis for this admission?: Yes   


Plan: 


Continue antibiotics.  Urine culture pending.








- Time


Time Spent with patient: 25-34 minutes

## 2019-07-21 NOTE — RADIOLOGY REPORT (SQ)
EXAM DESCRIPTION:  MRI HEAD WITHOUT



COMPLETED DATE/TIME:  7/21/2019 12:04 pm



REASON FOR STUDY:  RIGHT SIDE WEAKNESS



COMPARISON:  CT dated 7/20/2019.  MR dated 11/23/2013.



TECHNIQUE:  Multiplanar imaging includes non-contrasted T1, T2, FLAIR, and diffusion with ADC map seq
uences.  Images stored on PACS.



LIMITATIONS:  Motion artifact.



FINDINGS:  ANATOMY: No anomalies. Normal vascular flow voids. Pituitary fossa normal.

CSF SPACES: Atrophy induced prominence of ventricles and CSF spaces.  Prominent CSF fluid density ove
rlying both cerebral hemispheres.  Maximum fluid thickness on the right approximately 5 mm and on the
 left approximately 8 mm.

CEREBRUM: High signal intensity lesions scattered throughout the white matter on FLAIR imaging with d
istribution suggesting micro-vascular ischemic changes.  No evidence of hemorrhage, mass, or extraaxi
al fluid collection.

POSTERIOR FOSSA: No signal alteration. No hemorrhage. No edema, masses or mass effect.  Internal jeanette
tory canals, cerebello-pontine angles, mastoids normal.

DIFFUSION IMAGING: Negative for acute or sub-acute infarction.

ORBITS: No masses. Globes normal.

PARANASAL  SINUSES: No fluid levels.  Mucosa normal.

OTHER: No other significant finding.



IMPRESSION:

1. PROMINENT CSF FLUID DENSITY OVERLYING BOTH CEREBRAL HEMISPHERES.  NO MASS EFFECT.  THIS MAY BE DUE
 TO GENERALIZED CEREBRAL ATROPHY.  OTHER POSSIBILITIES INCLUDE OLD SUBDURAL HEMATOMAS OR SUBDURAL HYG
ROMAS.

2. ATROPHY AND CHRONIC MICRO-VASCULAR ISCHEMIC CHANGES.  NO ACUTE FINDINGS.

EVIDENCE OF ACUTE STROKE: NO.



TECHNICAL DOCUMENTATION:  JOB ID:  [id number]

 2011 [employer]- All Rights Reserved



Reading location - IP/workstation name: [email or web address]

## 2019-07-22 RX ADMIN — FERROUS SULFATE TAB 325 MG (65 MG ELEMENTAL FE) SCH: 325 (65 FE) TAB at 10:05

## 2019-07-22 RX ADMIN — MORPHINE SULFATE PRN MG: 10 INJECTION INTRAMUSCULAR; INTRAVENOUS; SUBCUTANEOUS at 13:30

## 2019-07-22 RX ADMIN — PANTOPRAZOLE SODIUM SCH MG: 40 INJECTION, POWDER, FOR SOLUTION INTRAVENOUS at 10:04

## 2019-07-22 RX ADMIN — LEVOTHYROXINE SODIUM SCH: 88 TABLET ORAL at 06:01

## 2019-07-22 RX ADMIN — CYANOCOBALAMIN TAB 1000 MCG SCH: 1000 TAB at 10:05

## 2019-07-22 RX ADMIN — SULFAMETHOXAZOLE AND TRIMETHOPRIM SCH: 800; 160 TABLET ORAL at 17:47

## 2019-07-22 RX ADMIN — FOLIC ACID SCH: 1 TABLET ORAL at 10:05

## 2019-07-22 RX ADMIN — HEPARIN SODIUM SCH UNIT: 5000 INJECTION, SOLUTION INTRAVENOUS; SUBCUTANEOUS at 13:33

## 2019-07-22 RX ADMIN — HEPARIN SODIUM SCH: 5000 INJECTION, SOLUTION INTRAVENOUS; SUBCUTANEOUS at 06:01

## 2019-07-22 RX ADMIN — HEPARIN SODIUM SCH UNIT: 5000 INJECTION, SOLUTION INTRAVENOUS; SUBCUTANEOUS at 21:51

## 2019-07-22 RX ADMIN — DOCUSATE SODIUM SCH: 100 CAPSULE, LIQUID FILLED ORAL at 10:05

## 2019-07-22 RX ADMIN — MORPHINE SULFATE PRN MG: 10 INJECTION INTRAMUSCULAR; INTRAVENOUS; SUBCUTANEOUS at 19:38

## 2019-07-22 RX ADMIN — DOCUSATE SODIUM SCH: 100 CAPSULE, LIQUID FILLED ORAL at 17:47

## 2019-07-22 RX ADMIN — MORPHINE SULFATE PRN MG: 10 INJECTION INTRAMUSCULAR; INTRAVENOUS; SUBCUTANEOUS at 09:00

## 2019-07-22 RX ADMIN — CHOLECALCIFEROL TAB 10 MCG (400 UNIT) SCH: 10 TAB at 10:05

## 2019-07-22 RX ADMIN — SIMVASTATIN SCH: 10 TABLET, FILM COATED ORAL at 21:45

## 2019-07-22 NOTE — PDOC PROGRESS REPORT
Subjective


Progress Note for:: 07/22/19


Subjective:: 


This is a 74 year old female with advanced dementia, hypothyroidism, history of 

GI bleed and peptic ulcer disease who presented with worsening gait and 

recurrent falls.  Patient was also noted to be more confused than usual.  She 

was also noted to have urinary tract infection.





7/21: This morning she was noted to have questionable right-sided weakness.  

Stat MRI was done which was negative for an acute CVA.   does say that 

patient has been having worsening confusion and falls at home and is looking to 

placing her in long term care.





7/22: No acute event overnight.  Patient is oriented to any sphere due to end-

stage dementia.  Await placement to SNF.  Urine culture grew pansensitive E. 

coli.  We will switch IV antibiotics to PO.


Reason For Visit: 


CYSTITIS,MULTIPLE FALLS,DYSPHAGIA,DEMENTIA








Physical Exam


Vital Signs: 


                                        











Temp Pulse Resp BP Pulse Ox


 


 97.7 F   55 L  14   166/61 H  99 


 


 07/22/19 00:40  07/22/19 00:40  07/22/19 00:40  07/22/19 00:40  07/22/19 00:40








                                 Intake & Output











 07/21/19 07/22/19 07/23/19





 06:59 06:59 06:59


 


Intake Total 1050 50 


 


Balance 1050 50 


 


Weight 112 lb 3.445 oz  











General appearance: PRESENT: no acute distress, well-developed, well-nourished


Head exam: PRESENT: atraumatic, normocephalic


Eye exam: PRESENT: conjunctiva pink, EOMI, PERRLA.  ABSENT: scleral icterus


Ear exam: PRESENT: normal external ear exam


Mouth exam: PRESENT: moist, tongue midline


Neck exam: ABSENT: carotid bruit, JVD, lymphadenopathy, thyromegaly


Respiratory exam: PRESENT: clear to auscultation prince.  ABSENT: rales, rhonchi, 

wheezes


Cardiovascular exam: PRESENT: RRR.  ABSENT: diastolic murmur, rubs, systolic 

murmur


Pulses: PRESENT: normal dorsalis pedis pul


GI/Abdominal exam: PRESENT: normal bowel sounds, soft.  ABSENT: distended, 

guarding, mass, organolmegaly, rebound, tenderness


Rectal exam: PRESENT: deferred


Neurological exam: PRESENT: alert, awake.  ABSENT: oriented to person, oriented 

to place, oriented to time





Results


Laboratory Results: 


                                        





                                 07/21/19 05:54 





                                 07/20/19 08:01 





                                        





07/20/19 08:50   Catheterized Urine   Urine Culture - Final


                            Escherichia Coli





                                        











  07/20/19 07/20/19





  08:01 08:01


 


Creatine Kinase  125 


 


Troponin I   < 0.012











Impressions: 


                                        





Cervical Spine CT  07/20/19 07:18


IMPRESSION:  CHRONIC DEGENERATIVE CHANGES.  NO ACUTE FINDINGS.


 








Head CT  07/20/19 07:18


IMPRESSION:  CHRONIC CHANGES OF ATROPHY AND MICROVASCULAR ISCHEMIA.  NO ACUTE 

PROCESS.


EVIDENCE OF ACUTE STROKE: NO.


 








Hip/Pelvis X-Ray  07/20/19 07:18


IMPRESSION:  NEGATIVE STUDY OF THE RIGHT HIP. NO RADIOGRAPHIC EVIDENCE OF ACUTE 

INJURY.


 








Head MRI  07/21/19 00:00


IMPRESSION:


1. PROMINENT CSF FLUID DENSITY OVERLYING BOTH CEREBRAL HEMISPHERES.  NO MASS 

EFFECT.  THIS MAY BE DUE TO GENERALIZED CEREBRAL ATROPHY.  OTHER POSSIBILITIES 

INCLUDE OLD SUBDURAL HEMATOMAS OR SUBDURAL HYGROMAS.


2. ATROPHY AND CHRONIC MICRO-VASCULAR ISCHEMIC CHANGES.  NO ACUTE FINDINGS.


EVIDENCE OF ACUTE STROKE: NO.


 














Assessment and Plan





- Diagnosis


(1) Acute encephalopathy


Is this a current diagnosis for this admission?: Yes   


Plan: 


Multifactorial from UTI and worsening dementia.








(2) Dementia


Qualifiers: 


   Dementia type: Alzheimer's disease   Alzheimer's disease onset: unspecified 

onset   Dementia behavioral disturbance: without behavioral disturbance   

Qualified Code(s): G30.9 - Alzheimer's disease, unspecified; F02.80 - Dementia 

in other diseases classified elsewhere without behavioral disturbance   


Is this a current diagnosis for this admission?: Yes   


Plan: 


Patient has end-stage dementia and is requiring significant care at home.  She 

will need long-term placement.








(3) Urinary tract infection


Qualifiers: 


   Urinary tract infection type: acute cystitis   Hematuria presence: without 

hematuria   Qualified Code(s): N30.00 - Acute cystitis without hematuria   


Is this a current diagnosis for this admission?: Yes   


Plan: 


Continue antibiotics.  Urine culture grew pansensitive E. coli.  We will switch 

to p.o. a T-max.








- Time


Time Spent with patient: 15-24 minutes

## 2019-07-23 RX ADMIN — SCOPOLAMINE SCH EACH: 1 PATCH, EXTENDED RELEASE TRANSDERMAL at 18:08

## 2019-07-23 RX ADMIN — HEPARIN SODIUM SCH UNIT: 5000 INJECTION, SOLUTION INTRAVENOUS; SUBCUTANEOUS at 14:07

## 2019-07-23 RX ADMIN — CHOLECALCIFEROL TAB 10 MCG (400 UNIT) SCH: 10 TAB at 10:19

## 2019-07-23 RX ADMIN — DOCUSATE SODIUM SCH: 100 CAPSULE, LIQUID FILLED ORAL at 10:16

## 2019-07-23 RX ADMIN — LEVOTHYROXINE SODIUM SCH: 88 TABLET ORAL at 05:13

## 2019-07-23 RX ADMIN — PANTOPRAZOLE SODIUM SCH MG: 40 INJECTION, POWDER, FOR SOLUTION INTRAVENOUS at 10:25

## 2019-07-23 RX ADMIN — FERROUS SULFATE TAB 325 MG (65 MG ELEMENTAL FE) SCH: 325 (65 FE) TAB at 10:16

## 2019-07-23 RX ADMIN — FOLIC ACID SCH: 1 TABLET ORAL at 10:16

## 2019-07-23 RX ADMIN — CYANOCOBALAMIN TAB 1000 MCG SCH: 1000 TAB at 10:18

## 2019-07-23 RX ADMIN — LORAZEPAM PRN MG: 2 INJECTION INTRAMUSCULAR; INTRAVENOUS at 22:33

## 2019-07-23 RX ADMIN — MORPHINE SULFATE PRN MG: 10 INJECTION INTRAMUSCULAR; INTRAVENOUS; SUBCUTANEOUS at 13:11

## 2019-07-23 RX ADMIN — SULFAMETHOXAZOLE AND TRIMETHOPRIM SCH: 800; 160 TABLET ORAL at 10:18

## 2019-07-23 RX ADMIN — HEPARIN SODIUM SCH: 5000 INJECTION, SOLUTION INTRAVENOUS; SUBCUTANEOUS at 05:14

## 2019-07-23 NOTE — RADIOLOGY REPORT (SQ)
EXAM DESCRIPTION:  MRI HEAD WITHOUT



COMPLETED DATE/TIME:  7/23/2019 1:50 pm



REASON FOR STUDY:  stroke



COMPARISON:  MRI brain dated 7/21/2019



TECHNIQUE:  Multiplanar imaging includes non-contrasted T1, T2, FLAIR, and diffusion with ADC map seq
uences.  Images stored on PACS.



LIMITATIONS:  None.



FINDINGS:  ANATOMY: No anomalies. Normal vascular flow voids. Pituitary fossa normal.

CSF SPACES: Atrophy induced prominence of ventricles and CSF spaces.  The patient has developed a rig
ht-sided subdural hematoma there is mixed signal intensity.  Largest diameter is 12.2 mm.  The subdur
al hygroma on the left measures 9.4 mm.  There is some high attenuating material posteriorly on today
's exam consistent with acute blood products as well.

CEREBRUM: High signal intensity lesions scattered throughout the white matter on FLAIR imaging with d
istribution suggesting micro-vascular ischemic changes.  No evidence of hemorrhage, mass, or extraaxi
al fluid collection.

POSTERIOR FOSSA: No signal alteration. No hemorrhage. No edema, masses or mass effect.  Internal jeanette
tory canals, cerebello-pontine angles, mastoids normal.

DIFFUSION IMAGING: Negative for acute or sub-acute infarction.

ORBITS: No masses. Globes normal.

PARANASAL  SINUSES: No fluid levels.  Mucosa normal.

OTHER: No other significant finding.



IMPRESSION:  1. The patient has developed bilateral subdural hematomas as described.  There is no mid
line shift.  Due to diffuse cerebral atrophy there is no significant mass effect.

2.  Diffuse generalized atrophy and small-vessel ischemic changes stable in appearance.

EVIDENCE OF ACUTE STROKE: NO.



COMMENT:   Pertinent findings on the imaging study reported as a CRITICAL RESULT to REBEL SCHAEFFER at16:35 on 7/23/2019.

Category of Critical Result: Intracranial hemorrhage.



TECHNICAL DOCUMENTATION:  JOB ID:  [id number]

 2011 [employer]- All Rights Reserved



Reading location - IP/workstation name: [email or web address]

## 2019-07-23 NOTE — PDOC PROGRESS REPORT
Subjective


Progress Note for:: 07/23/19


Subjective:: 





74 year old female with advanced dementia, hypothyroidism, history of GI bleed 

and peptic ulcer disease who presented with worsening gait and recurrent falls. 

Patient was also noted to be more confused than usual.  She was also noted to 

have urinary tract infection.





7/21: This morning she was noted to have questionable right-sided weakness.  

Stat MRI was done which was negative for an acute CVA.   does say that 

patient has been having worsening confusion and falls at home and is looking to 

placing her in long term care.





7/22: No acute event overnight.  Patient is oriented to any sphere due to end-

stage dementia.  Await placement to SNF.  Urine culture grew pansensitive E. 

coli.  We will switch IV antibiotics to PO.





7/23/2019-patient is admitted with altered mental status she has advanced 

dementia as per the patient  and the family she fell at home prior to 

admission complaining of right-sided arm pain weakness right lower leg pain weak

ness at the time of admission MRI of the brain was done on 21st which shows 

increased density of the CSF fluid it can be due to old subdural hematomas or 

subdural hygromas.  No mass-effect was seen.  I had a long discussion with the 

family members along with hospice representative Vargas  wants to know 

exactly what happened like problem with swallowing right arm pain weakness and 

right lower leg pain and weakness he wants the test to be repeated including the

MRI of the brain once CT pelvis to be done to rule out any fracture of the hip. 

We are going to do those things today.  He is also requested to be placed on IV 

fluids for short-term and requesting for evaluation of the speech in 24 to 48 

hours.  Patient's  also requesting the physical therapy to be on board.  

She is a DNR/DNI.


Reason For Visit: 


CYSTITIS,MULTIPLE FALLS,DYSPHAGIA,DEMENTIA








Physical Exam


Vital Signs: 


                                        











Temp Pulse Resp BP Pulse Ox


 


 98.3 F   76   19   156/68 H  100 


 


 07/23/19 07:38  07/23/19 07:38  07/23/19 07:38  07/23/19 07:38  07/23/19 07:38








                                 Intake & Output











 07/22/19 07/23/19 07/24/19





 06:59 06:59 06:59


 


Intake Total 50  


 


Balance 50  


 


Weight  52.3 kg 











General appearance: PRESENT: mild distress, thin


Head exam: PRESENT: atraumatic


Eye exam: PRESENT: other - pupils are sluggishly reactive.


Mouth exam: PRESENT: moist, tongue midline


Teeth exam: PRESENT: poor dentation


Neck exam: ABSENT: carotid bruit, JVD, lymphadenopathy, thyromegaly


Respiratory exam: PRESENT: decreased breath sounds


Cardiovascular exam: PRESENT: RRR.  ABSENT: diastolic murmur, rubs, systolic 

murmur


GI/Abdominal exam: PRESENT: normal bowel sounds, soft.  ABSENT: distended, 

guarding, mass, organolmegaly, rebound, tenderness


Rectal exam: PRESENT: deferred


Gentrourinary exam: PRESENT: indwelling catheter


Neurological exam: PRESENT: altered, other - On examination right upper arm 

weakness power 0 x 5.  Right lower leg weakness power is 1-2/5.





Results


Laboratory Results: 


                                        





                                 07/21/19 05:54 





                                 07/20/19 08:01 





                                        





07/20/19 08:50   Catheterized Urine   Urine Culture - Final


                            Escherichia Coli





                                        











  07/20/19 07/20/19





  08:01 08:01


 


Creatine Kinase  125 


 


Troponin I   < 0.012











Impressions: 


                                        





Cervical Spine CT  07/20/19 07:18


IMPRESSION:  CHRONIC DEGENERATIVE CHANGES.  NO ACUTE FINDINGS.


 








Head CT  07/20/19 07:18


IMPRESSION:  CHRONIC CHANGES OF ATROPHY AND MICROVASCULAR ISCHEMIA.  NO ACUTE 

PROCESS.


EVIDENCE OF ACUTE STROKE: NO.


 








Hip/Pelvis X-Ray  07/20/19 07:18


IMPRESSION:  NEGATIVE STUDY OF THE RIGHT HIP. NO RADIOGRAPHIC EVIDENCE OF ACUTE 

INJURY.


 








Head MRI  07/21/19 00:00


IMPRESSION:


1. PROMINENT CSF FLUID DENSITY OVERLYING BOTH CEREBRAL HEMISPHERES.  NO MASS 

EFFECT.  THIS MAY BE DUE TO GENERALIZED CEREBRAL ATROPHY.  OTHER POSSIBILITIES 

INCLUDE OLD SUBDURAL HEMATOMAS OR SUBDURAL HYGROMAS.


2. ATROPHY AND CHRONIC MICRO-VASCULAR ISCHEMIC CHANGES.  NO ACUTE FINDINGS.


EVIDENCE OF ACUTE STROKE: NO.


 














Assessment and Plan





- Diagnosis


(1) Acute encephalopathy


Is this a current diagnosis for this admission?: Yes   


Plan: 


Multifactorial from UTI and worsening dementia.





7/23/2019-in my opinion altered mental status/acute encephalopathy most likely 

secondary to possible stroke based on the physical examination and also has 

advanced underlying dementia.  Plan is to repeat the MRI of the brain today.  

Physical therapy on board.  Patient is DNR/DNI.  Once the results are available 

family wants to make a decision about comfort care measures.








(2) Dementia


Qualifiers: 


   Dementia type: Alzheimer's disease   Alzheimer's disease onset: unspecified 

onset   Dementia behavioral disturbance: without behavioral disturbance   Qual

ified Code(s): G30.9 - Alzheimer's disease, unspecified; F02.80 - Dementia in 

other diseases classified elsewhere without behavioral disturbance   


Is this a current diagnosis for this admission?: Yes   


Plan: 


Patient has end-stage dementia and is requiring significant care at home.  She 

will need long-term placement.





7/23/2019-patient has end-stage dementia.  Her  is taking care of her 

while at home.  Family is waiting for the further investigations to make a 

decision whether to send her to rehab facility or to hospice care.








(3) Fall


Qualifiers: 


   Encounter type: initial encounter   Qualified Code(s): W19.XXXA - Unspecified

fall, initial encounter   


Is this a current diagnosis for this admission?: Yes   


Plan: 


7/20/2019-the patient has been having unsteady gait for probably a month 

according to the patient's .  There is no evidence of acute infarct by CT

scan of the head but rather chronic ischemic changes.  She does have prominent 

ventricles.  Hydrocephalus is a potential contributing factor.  Her advanced 

dementia, however, is the most likely cause of her imbalance.  I will have phys

ical therapy work with her.  We will see if she might benefit from a walker for 

safety and possibly work on strength to help with her gait.





7/23/2019-patient was admitted with history of fall associated with right-sided 

weakness.  CT head was negative for acute changes.  MRI of the brain shows CSF 

fluid density no mass-effect but possible old subdural hematoma/subdural 

hygromas.  Needs to repeat the MRI of the brain and CT pelvis today.








(4) Urinary tract infection


Qualifiers: 


   Urinary tract infection type: acute cystitis   Hematuria presence: without 

hematuria   Qualified Code(s): N30.00 - Acute cystitis without hematuria   


Is this a current diagnosis for this admission?: Yes   


Plan: 


Continue antibiotics.  Urine culture grew pansensitive E. coli.  We will switch 

to p.o. a T-max.





7/23/2019-patient came in with UTI urine culture showing E. coli T-max is 98.2. 

Because the patient is going to be n.p.o. plan is to switch Bactrim to IV 

Rocephin.








(5) Diabetes mellitus type II, controlled


Qualifiers: 


   Diabetes mellitus long term insulin use: without long term use   Diabetes 

mellitus complication status: without complication   Qualified Code(s): E11.9 - 

Type 2 diabetes mellitus without complications   


Is this a current diagnosis for this admission?: Yes   


Plan: 


7/20/2019-the patient's  performs Accu-Cheks.  He states that they are 

typically between 101 150.  She is on no medications but he does try to maintain

a reasonable diabetic diet.  At this time I will not order hemoglobin A1c 

especially because her serum glucose at the time of admission was only 133.





7/23/2019-patient is n.p.o. from today because she failed swallowing evaluation.

 Recommendation from the speech therapist is nothing by mouth.








(6) Dysphagia, pharyngeal phase


Is this a current diagnosis for this admission?: Yes   


Plan: 


7/20/2019-as noted above I have some concern about dysphasia.  In March 2017 she

had a modified barium swallow with slight penetration of thin liquids.  I will 

go ahead and change her diet to nectar thick liquids.  Await speech therapies 

recommendations.  The patient may even need assistance with meals.





1/23/2019-speech evaluation was done the recommendation is nothing by mouth 

because of the high risk for aspiration.  Most likely secondary to new onset 

stroke.  Did an IV fluids today family is not ready to be on PEG tube placement.








- Time


Time Spent with patient: 25-34 minutes


Medications reviewed and adjusted accordingly: Yes


Anticipated discharge: Hospice

## 2019-07-23 NOTE — RADIOLOGY REPORT (SQ)
EXAM DESCRIPTION:  CT PELVIS WITHOUT



COMPLETED DATE/TIME:  7/23/2019 1:33 pm



REASON FOR STUDY:  fall with rt hip pain



COMPARISON:  None.



TECHNIQUE:  CT scan of the pelvis performed without intravenous or oral contrast.  Images reviewed wi
th soft tissue and bone windows.  Reconstructed coronal and sagittal MPR images reviewed.  All images
 stored on PACS.

All CT scanners at this facility use dose modulation, iterative reconstruction, and/or weight based d
osing when appropriate to reduce radiation dose to as low as reasonably achievable (ALARA).

[facility]: Dose Right  [facility]: CareDose    [facility]: Dose Right    [facility]: Teradose 4D    [facility]: Smart Platypus TV



RADIATION DOSE:   mGy.



LIMITATIONS:  None.



FINDINGS:  PELVIC BONES: No acute fracture. No worrisome bone lesions.

VISUALIZED SPINE: Lower lumbar degenerative disc disease and facet arthropathy.

HIP(S): No acute fracture or dislocation. No worrisome bone lesions.

PELVIC SOFT TISSUES: No significant findings.

EXTRAPELVIC SOFT TISSUES: No significant findings.

OTHER: No other significant finding.



IMPRESSION:  Normal left hip.  Lumbar degenerative changes.



TECHNICAL DOCUMENTATION:  JOB ID:  [id number]

Quality ID # [id number]: Final reports with documentation of one or more dose reduction techniques (e.g., Au
tomated exposure control, adjustment of the mA and/or kV according to patient size, use of iterative 
reconstruction technique)

 2011 [employer]- All Rights Reserved



Reading location - IP/workstation name: [email or web address]

## 2019-07-24 RX ADMIN — ATROPINE SULFATE PRN DROP: 10 SOLUTION OPHTHALMIC at 17:23

## 2019-07-24 RX ADMIN — MORPHINE SULFATE PRN MG: 10 INJECTION INTRAMUSCULAR; INTRAVENOUS; SUBCUTANEOUS at 22:32

## 2019-07-24 RX ADMIN — MORPHINE SULFATE PRN MG: 10 INJECTION INTRAMUSCULAR; INTRAVENOUS; SUBCUTANEOUS at 14:19

## 2019-07-24 RX ADMIN — MORPHINE SULFATE PRN MG: 10 INJECTION INTRAMUSCULAR; INTRAVENOUS; SUBCUTANEOUS at 16:29

## 2019-07-24 RX ADMIN — ATROPINE SULFATE PRN DROP: 10 SOLUTION OPHTHALMIC at 22:33

## 2019-07-24 RX ADMIN — MORPHINE SULFATE PRN MG: 10 INJECTION INTRAMUSCULAR; INTRAVENOUS; SUBCUTANEOUS at 18:57

## 2019-07-24 NOTE — PDOC PROGRESS REPORT
Subjective


Progress Note for:: 07/24/19


Subjective:: 





74 year old female with advanced dementia, hypothyroidism, history of GI bleed 

and peptic ulcer disease who presented with worsening gait and recurrent falls. 

Patient was also noted to be more confused than usual.  She was also noted to 

have urinary tract infection.





7/21: This morning she was noted to have questionable right-sided weakness.  

Stat MRI was done which was negative for an acute CVA.   does say that 

patient has been having worsening confusion and falls at home and is looking to 

placing her in long term care.





7/22: No acute event overnight.  Patient is oriented to any sphere due to end-

stage dementia.  Await placement to SNF.  Urine culture grew pansensitive E. 

coli.  We will switch IV antibiotics to PO.





7/23/2019-patient is admitted with altered mental status she has advanced 

dementia as per the patient  and the family she fell at home prior to 

admission complaining of right-sided arm pain weakness right lower leg pain weak

ness at the time of admission MRI of the brain was done on 21st which shows 

increased density of the CSF fluid it can be due to old subdural hematomas or 

subdural hygromas.  No mass-effect was seen.  I had a long discussion with the 

family members along with hospice representative Vargas  wants to know 

exactly what happened like problem with swallowing right arm pain weakness and 

right lower leg pain and weakness he wants the test to be repeated including the

MRI of the brain once CT pelvis to be done to rule out any fracture of the hip. 

We are going to do those things today.  He is also requested to be placed on IV 

fluids for short-term and requesting for evaluation of the speech in 24 to 48 

hours.  Patient's  also requesting the physical therapy to be on board.  

She is a DNR/DNI.





7/24 /2019-patient's family decided to make her comfort care measures only.  

Presently on IV morphine and IV Ativan.  Family's request is to keep the patient

here as long as we can.


Reason For Visit: 


CYSTITIS,MULTIPLE FALLS,DYSPHAGIA,DEMENTIA








Physical Exam


Vital Signs: 


                                        











Temp Pulse Resp BP Pulse Ox


 


 98.4 F   68   18   121/57 L  96 


 


 07/23/19 15:13  07/23/19 15:13  07/23/19 15:13  07/23/19 15:13  07/23/19 15:13








                                 Intake & Output











 07/23/19 07/24/19 07/25/19





 06:59 06:59 06:59


 


Intake Total  1050 


 


Balance  1050 


 


Weight 52.3 kg 51.5 kg 











General appearance: PRESENT: no acute distress


Head exam: PRESENT: atraumatic


Eye exam: PRESENT: PERRLA


Mouth exam: PRESENT: moist, tongue midline


Teeth exam: PRESENT: poor dentation


Neck exam: ABSENT: carotid bruit, JVD, lymphadenopathy, thyromegaly


Respiratory exam: PRESENT: clear to auscultation prince.  ABSENT: rales, rhonchi, 

wheezes


Cardiovascular exam: PRESENT: RRR.  ABSENT: diastolic murmur, rubs, systolic 

murmur


Pulses: PRESENT: normal dorsalis pedis pul


GI/Abdominal exam: PRESENT: normal bowel sounds, soft.  ABSENT: distended, 

guarding, mass, organolmegaly, rebound, tenderness


Rectal exam: PRESENT: deferred


Neurological exam: PRESENT: altered





Results


Laboratory Results: 


                                        





                                 07/21/19 05:54 





                                 07/20/19 08:01 





                                        











  07/20/19 07/20/19





  08:01 08:01


 


Creatine Kinase  125 


 


Troponin I   < 0.012











Impressions: 


                                        





Cervical Spine CT  07/20/19 07:18


IMPRESSION:  CHRONIC DEGENERATIVE CHANGES.  NO ACUTE FINDINGS.


 








Head CT  07/20/19 07:18


IMPRESSION:  CHRONIC CHANGES OF ATROPHY AND MICROVASCULAR ISCHEMIA.  NO ACUTE 

PROCESS.


EVIDENCE OF ACUTE STROKE: NO.


 








Hip/Pelvis X-Ray  07/20/19 07:18


IMPRESSION:  NEGATIVE STUDY OF THE RIGHT HIP. NO RADIOGRAPHIC EVIDENCE OF ACUTE 

INJURY.


 








Head MRI  07/23/19 00:00


IMPRESSION:  1. The patient has developed bilateral subdural hematomas as 

described.  There is no midline shift.  Due to diffuse cerebral atrophy there is

no significant mass effect.


2.  Diffuse generalized atrophy and small-vessel ischemic changes stable in 

appearance.


EVIDENCE OF ACUTE STROKE: NO.


 








Pelvis CT  07/23/19 00:00


IMPRESSION:  Normal left hip.  Lumbar degenerative changes.


 














Assessment and Plan





- Diagnosis


(1) Acute encephalopathy


Is this a current diagnosis for this admission?: Yes   





(2) Dementia


Qualifiers: 


   Dementia type: Alzheimer's disease   Alzheimer's disease onset: unspecified 

onset   Dementia behavioral disturbance: without behavioral disturbance   

Qualified Code(s): G30.9 - Alzheimer's disease, unspecified; F02.80 - Dementia 

in other diseases classified elsewhere without behavioral disturbance   


Is this a current diagnosis for this admission?: Yes   





(3) Fall


Qualifiers: 


   Encounter type: initial encounter   Qualified Code(s): W19.XXXA - Unspecified

fall, initial encounter   


Is this a current diagnosis for this admission?: Yes   





(4) Urinary tract infection


Qualifiers: 


   Urinary tract infection type: acute cystitis   Hematuria presence: without 

hematuria   Qualified Code(s): N30.00 - Acute cystitis without hematuria   


Is this a current diagnosis for this admission?: Yes   





(5) Diabetes mellitus type II, controlled


Qualifiers: 


   Diabetes mellitus long term insulin use: without long term use   Diabetes 

mellitus complication status: without complication   Qualified Code(s): E11.9 - 

Type 2 diabetes mellitus without complications   


Is this a current diagnosis for this admission?: Yes   





(6) Dysphagia, pharyngeal phase


Is this a current diagnosis for this admission?: Yes   





(7) Comfort measures only status


Is this a current diagnosis for this admission?: Yes   


Plan: 


Patient is under comfort care measures only status as per the family request 

repeat MRI yesterday shows bilateral subdural hematomas.  Family decided to make

the patient comfort care measures only from yesterday.








- Time


Time Spent with patient: 15-24 minutes


Anticipated discharge: Hospice

## 2019-07-25 RX ADMIN — MORPHINE SULFATE PRN MG: 10 INJECTION INTRAMUSCULAR; INTRAVENOUS; SUBCUTANEOUS at 14:30

## 2019-07-25 RX ADMIN — MORPHINE SULFATE PRN MG: 10 INJECTION INTRAMUSCULAR; INTRAVENOUS; SUBCUTANEOUS at 06:54

## 2019-07-25 RX ADMIN — MORPHINE SULFATE PRN MG: 10 INJECTION INTRAMUSCULAR; INTRAVENOUS; SUBCUTANEOUS at 08:58

## 2019-07-25 NOTE — PDOC PROGRESS REPORT
Subjective


Progress Note for:: 07/25/19


Subjective:: 





74 year old female with advanced dementia, hypothyroidism, history of GI bleed 

and peptic ulcer disease who presented with worsening gait and recurrent falls. 

Patient was also noted to be more confused than usual.  She was also noted to 

have urinary tract infection.





7/21: This morning she was noted to have questionable right-sided weakness.  

Stat MRI was done which was negative for an acute CVA.   does say that 

patient has been having worsening confusion and falls at home and is looking to 

placing her in long term care.





7/22: No acute event overnight.  Patient is oriented to any sphere due to end-

stage dementia.  Await placement to SNF.  Urine culture grew pansensitive E. 

coli.  We will switch IV antibiotics to PO.





7/23/2019-patient is admitted with altered mental status she has advanced 

dementia as per the patient  and the family she fell at home prior to 

admission complaining of right-sided arm pain weakness right lower leg pain weak

ness at the time of admission MRI of the brain was done on 21st which shows 

increased density of the CSF fluid it can be due to old subdural hematomas or 

subdural hygromas.  No mass-effect was seen.  I had a long discussion with the 

family members along with hospice representative Vargas  wants to know 

exactly what happened like problem with swallowing right arm pain weakness and 

right lower leg pain and weakness he wants the test to be repeated including the

MRI of the brain once CT pelvis to be done to rule out any fracture of the hip. 

We are going to do those things today.  He is also requested to be placed on IV 

fluids for short-term and requesting for evaluation of the speech in 24 to 48 

hours.  Patient's  also requesting the physical therapy to be on board.  

She is a DNR/DNI.





7/24 /2019-patient's family decided to make her comfort care measures only.  

Presently on IV morphine and IV Ativan.  Family's request is to keep the patient

here as long as we can.





7/25/2019-patient is on comfort care measures only.


Reason For Visit: 


CYSTITIS,MULTIPLE FALLS,DYSPHAGIA,DEMENTIA








Physical Exam


Vital Signs: 


                                        











Temp Pulse Resp BP Pulse Ox


 


 98.4 F   68   18   121/57 L  96 


 


 07/23/19 15:13  07/23/19 15:13  07/23/19 15:13  07/23/19 15:13  07/23/19 15:13








                                 Intake & Output











 07/24/19 07/25/19 07/26/19





 06:59 06:59 06:59


 


Intake Total 1050 0 


 


Balance 1050 0 


 


Weight 51.5 kg 51.6 kg 











General appearance: PRESENT: no acute distress, other - Is resting comfortably.





Results


Laboratory Results: 


                                        





                                 07/21/19 05:54 





                                 07/20/19 08:01 





                                        











  07/20/19 07/20/19





  08:01 08:01


 


Creatine Kinase  125 


 


Troponin I   < 0.012











Impressions: 


                                        





Cervical Spine CT  07/20/19 07:18


IMPRESSION:  CHRONIC DEGENERATIVE CHANGES.  NO ACUTE FINDINGS.


 








Head CT  07/20/19 07:18


IMPRESSION:  CHRONIC CHANGES OF ATROPHY AND MICROVASCULAR ISCHEMIA.  NO ACUTE 

PROCESS.


EVIDENCE OF ACUTE STROKE: NO.


 








Hip/Pelvis X-Ray  07/20/19 07:18


IMPRESSION:  NEGATIVE STUDY OF THE RIGHT HIP. NO RADIOGRAPHIC EVIDENCE OF ACUTE 

INJURY.


 








Head MRI  07/23/19 00:00


IMPRESSION:  1. The patient has developed bilateral subdural hematomas as 

described.  There is no midline shift.  Due to diffuse cerebral atrophy there is

no significant mass effect.


2.  Diffuse generalized atrophy and small-vessel ischemic changes stable in 

appearance.


EVIDENCE OF ACUTE STROKE: NO.


 








Pelvis CT  07/23/19 00:00


IMPRESSION:  Normal left hip.  Lumbar degenerative changes.


 














Assessment and Plan





- Diagnosis


(1) Acute encephalopathy


Is this a current diagnosis for this admission?: Yes   





(2) Dementia


Qualifiers: 


   Dementia type: Alzheimer's disease   Alzheimer's disease onset: unspecified 

onset   Dementia behavioral disturbance: without behavioral disturbance   

Qualified Code(s): G30.9 - Alzheimer's disease, unspecified; F02.80 - Dementia 

in other diseases classified elsewhere without behavioral disturbance   


Is this a current diagnosis for this admission?: Yes   





(3) Fall


Qualifiers: 


   Encounter type: initial encounter   Qualified Code(s): W19.XXXA - Unspecified

fall, initial encounter   


Is this a current diagnosis for this admission?: Yes   





(4) Urinary tract infection


Qualifiers: 


   Urinary tract infection type: acute cystitis   Hematuria presence: without 

hematuria   Qualified Code(s): N30.00 - Acute cystitis without hematuria   


Is this a current diagnosis for this admission?: Yes   





(5) Diabetes mellitus type II, controlled


Qualifiers: 


   Diabetes mellitus long term insulin use: without long term use   Diabetes 

mellitus complication status: without complication   Qualified Code(s): E11.9 - 

Type 2 diabetes mellitus without complications   


Is this a current diagnosis for this admission?: Yes   





(6) Dysphagia, pharyngeal phase


Is this a current diagnosis for this admission?: Yes   





(7) Comfort measures only status


Is this a current diagnosis for this admission?: Yes

## 2019-07-26 RX ADMIN — LORAZEPAM PRN MG: 2 INJECTION INTRAMUSCULAR; INTRAVENOUS at 10:56

## 2019-07-26 RX ADMIN — MORPHINE SULFATE PRN MG: 10 INJECTION INTRAMUSCULAR; INTRAVENOUS; SUBCUTANEOUS at 18:04

## 2019-07-26 RX ADMIN — MORPHINE SULFATE PRN MG: 10 INJECTION INTRAMUSCULAR; INTRAVENOUS; SUBCUTANEOUS at 09:21

## 2019-07-26 RX ADMIN — MORPHINE SULFATE PRN MG: 10 INJECTION INTRAMUSCULAR; INTRAVENOUS; SUBCUTANEOUS at 14:13

## 2019-07-26 RX ADMIN — LORAZEPAM PRN MG: 2 INJECTION INTRAMUSCULAR; INTRAVENOUS at 08:23

## 2019-07-26 RX ADMIN — SCOPOLAMINE SCH EACH: 1 PATCH, EXTENDED RELEASE TRANSDERMAL at 10:26

## 2019-07-26 RX ADMIN — MORPHINE SULFATE PRN MG: 10 INJECTION INTRAMUSCULAR; INTRAVENOUS; SUBCUTANEOUS at 21:43

## 2019-07-26 NOTE — PDOC PROGRESS REPORT
Subjective


Progress Note for:: 07/26/19


Subjective:: 





74 year old female with advanced dementia, hypothyroidism, history of GI bleed 

and peptic ulcer disease who presented with worsening gait and recurrent falls. 

Patient was also noted to be more confused than usual.  She was also noted to 

have urinary tract infection.





7/21: This morning she was noted to have questionable right-sided weakness.  

Stat MRI was done which was negative for an acute CVA.   does say that 

patient has been having worsening confusion and falls at home and is looking to 

placing her in long term care.





7/22: No acute event overnight.  Patient is oriented to any sphere due to end-

stage dementia.  Await placement to SNF.  Urine culture grew pansensitive E. 

coli.  We will switch IV antibiotics to PO.





7/23/2019-patient is admitted with altered mental status she has advanced 

dementia as per the patient  and the family she fell at home prior to 

admission complaining of right-sided arm pain weakness right lower leg pain weak

ness at the time of admission MRI of the brain was done on 21st which shows 

increased density of the CSF fluid it can be due to old subdural hematomas or 

subdural hygromas.  No mass-effect was seen.  I had a long discussion with the 

family members along with hospice representative Vargas  wants to know 

exactly what happened like problem with swallowing right arm pain weakness and 

right lower leg pain and weakness he wants the test to be repeated including the

MRI of the brain once CT pelvis to be done to rule out any fracture of the hip. 

We are going to do those things today.  He is also requested to be placed on IV 

fluids for short-term and requesting for evaluation of the speech in 24 to 48 

hours.  Patient's  also requesting the physical therapy to be on board.  

She is a DNR/DNI.





7/24 /2019-patient's family decided to make her comfort care measures only.  

Presently on IV morphine and IV Ativan.  Family's request is to keep the patient

here as long as we can.





7/25/2019-patient is on comfort care measures only.





7/26/2019-patient is on comfort care measures only.  Patient is on a scopolamine

patch, morphine and Ativan.  Comfortably in the bed.  Family members at bedside 

they prefer to keep the patient here over the weekend.


Reason For Visit: 


CYSTITIS,MULTIPLE FALLS,DYSPHAGIA,DEMENTIA








Physical Exam


Vital Signs: 


                                        











Temp Pulse Resp BP Pulse Ox


 


 98.3 F   84   20   143/76 H  94 


 


 07/26/19 07:42  07/26/19 07:42  07/26/19 07:42  07/26/19 07:42  07/26/19 07:42








                                 Intake & Output











 07/25/19 07/26/19 07/27/19





 06:59 06:59 06:59


 


Intake Total 0 0 


 


Output Total  0 


 


Balance 0 0 


 


Weight 51.6 kg 52.3 kg 











General appearance: PRESENT: no acute distress, other - She does comfortably 

sleeping in the bed.  Not responsive.


Head exam: PRESENT: atraumatic


Eye exam: PRESENT: PERRLA


Mouth exam: PRESENT: dry mucosa


Teeth exam: PRESENT: edentulous


Neck exam: ABSENT: carotid bruit, JVD, lymphadenopathy, thyromegaly


Respiratory exam: PRESENT: other - pt having the deep shallow breathing.


Cardiovascular exam: PRESENT: tachycardia


GI/Abdominal exam: PRESENT: normal bowel sounds, soft.  ABSENT: distended, 

guarding, mass, organolmegaly, rebound, tenderness


Rectal exam: PRESENT: deferred


Neurological exam: PRESENT: altered





Results


Laboratory Results: 


                                        





                                 07/21/19 05:54 





                                 07/20/19 08:01 





                                        





07/20/19 15:28   Blood   Blood Culture - Final


                            NO GROWTH IN 5 DAYS


07/20/19 15:20   Blood   Blood Culture - Final


                            NO GROWTH IN 5 DAYS





                                        











  07/20/19 07/20/19





  08:01 08:01


 


Creatine Kinase  125 


 


Troponin I   < 0.012











Impressions: 


                                        





Cervical Spine CT  07/20/19 07:18


IMPRESSION:  CHRONIC DEGENERATIVE CHANGES.  NO ACUTE FINDINGS.


 








Head CT  07/20/19 07:18


IMPRESSION:  CHRONIC CHANGES OF ATROPHY AND MICROVASCULAR ISCHEMIA.  NO ACUTE 

PROCESS.


EVIDENCE OF ACUTE STROKE: NO.


 








Hip/Pelvis X-Ray  07/20/19 07:18


IMPRESSION:  NEGATIVE STUDY OF THE RIGHT HIP. NO RADIOGRAPHIC EVIDENCE OF ACUTE 

INJURY.


 








Head MRI  07/23/19 00:00


IMPRESSION:  1. The patient has developed bilateral subdural hematomas as 

described.  There is no midline shift.  Due to diffuse cerebral atrophy there is

no significant mass effect.


2.  Diffuse generalized atrophy and small-vessel ischemic changes stable in 

appearance.


EVIDENCE OF ACUTE STROKE: NO.


 








Pelvis CT  07/23/19 00:00


IMPRESSION:  Normal left hip.  Lumbar degenerative changes.


 














Assessment and Plan





- Diagnosis


(1) Acute encephalopathy


Is this a current diagnosis for this admission?: Yes   





(2) Dementia


Qualifiers: 


   Dementia type: Alzheimer's disease   Alzheimer's disease onset: unspecified 

onset   Dementia behavioral disturbance: without behavioral disturbance   

Qualified Code(s): G30.9 - Alzheimer's disease, unspecified; F02.80 - Dementia 

in other diseases classified elsewhere without behavioral disturbance   


Is this a current diagnosis for this admission?: Yes   





(3) Fall


Qualifiers: 


   Encounter type: initial encounter   Qualified Code(s): W19.XXXA - Unspecified

fall, initial encounter   


Is this a current diagnosis for this admission?: Yes   





(4) Urinary tract infection


Qualifiers: 


   Urinary tract infection type: acute cystitis   Hematuria presence: without 

hematuria   Qualified Code(s): N30.00 - Acute cystitis without hematuria   


Is this a current diagnosis for this admission?: Yes   





(5) Diabetes mellitus type II, controlled


Qualifiers: 


   Diabetes mellitus long term insulin use: without long term use   Diabetes 

mellitus complication status: without complication   Qualified Code(s): E11.9 - 

Type 2 diabetes mellitus without complications   


Is this a current diagnosis for this admission?: Yes   





(6) Dysphagia, pharyngeal phase


Is this a current diagnosis for this admission?: Yes   





(7) Comfort measures only status


Is this a current diagnosis for this admission?: Yes   





- Time


Time Spent with patient: 15-24 minutes


Medications reviewed and adjusted accordingly: Yes


Anticipated discharge: Hospice

## 2019-07-27 VITALS — DIASTOLIC BLOOD PRESSURE: 35 MMHG | SYSTOLIC BLOOD PRESSURE: 66 MMHG

## 2019-07-27 RX ADMIN — MORPHINE SULFATE PRN MG: 10 INJECTION INTRAMUSCULAR; INTRAVENOUS; SUBCUTANEOUS at 05:53

## 2019-07-27 RX ADMIN — LORAZEPAM PRN MG: 2 INJECTION INTRAMUSCULAR; INTRAVENOUS at 10:31

## 2019-07-27 RX ADMIN — MORPHINE SULFATE PRN MG: 10 INJECTION INTRAMUSCULAR; INTRAVENOUS; SUBCUTANEOUS at 15:06

## 2019-07-27 RX ADMIN — LORAZEPAM PRN MG: 2 INJECTION INTRAMUSCULAR; INTRAVENOUS at 00:01

## 2019-07-27 RX ADMIN — MORPHINE SULFATE PRN MG: 10 INJECTION INTRAMUSCULAR; INTRAVENOUS; SUBCUTANEOUS at 10:30

## 2019-07-27 NOTE — PDOC PROGRESS REPORT
Subjective


Progress Note for:: 07/27/19


Subjective:: 





74 year old female with advanced dementia, hypothyroidism, history of GI bleed 

and peptic ulcer disease who presented with worsening gait and recurrent falls. 

Patient was also noted to be more confused than usual.  She was also noted to 

have urinary tract infection.





7/21: This morning she was noted to have questionable right-sided weakness.  

Stat MRI was done which was negative for an acute CVA.   does say that 

patient has been having worsening confusion and falls at home and is looking to 

placing her in long term care.





7/22: No acute event overnight.  Patient is oriented to any sphere due to end-

stage dementia.  Await placement to SNF.  Urine culture grew pansensitive E. 

coli.  We will switch IV antibiotics to PO.





7/23/2019-patient is admitted with altered mental status she has advanced 

dementia as per the patient  and the family she fell at home prior to 

admission complaining of right-sided arm pain weakness right lower leg pain weak

ness at the time of admission MRI of the brain was done on 21st which shows 

increased density of the CSF fluid it can be due to old subdural hematomas or 

subdural hygromas.  No mass-effect was seen.  I had a long discussion with the 

family members along with hospice representative Vargas  wants to know 

exactly what happened like problem with swallowing right arm pain weakness and 

right lower leg pain and weakness he wants the test to be repeated including the

MRI of the brain once CT pelvis to be done to rule out any fracture of the hip. 

We are going to do those things today.  He is also requested to be placed on IV 

fluids for short-term and requesting for evaluation of the speech in 24 to 48 

hours.  Patient's  also requesting the physical therapy to be on board.  

She is a DNR/DNI.





7/24 /2019-patient's family decided to make her comfort care measures only.  

Presently on IV morphine and IV Ativan.  Family's request is to keep the patient

here as long as we can.





7/25/2019-patient is on comfort care measures only.





7/26/2019-patient is on comfort care measures only.  Patient is on a scopolamine

patch, morphine and Ativan.  Comfortably in the bed.  Family members at bedside 

they prefer to keep the patient here over the weekend.





7/27/2019-patient is under comfort care measures only.  On IV Ativan and 

morphine and like with the scopolamine patches.  She has a bed available at 

hospice but the family members wants to keep her here until Monday.


Reason For Visit: 


CYSTITIS,MULTIPLE FALLS,DYSPHAGIA,DEMENTIA








Physical Exam


Vital Signs: 


                                        











Temp Pulse Resp BP Pulse Ox


 


 98.2 F   96   25 H  127/63 H  84 L


 


 07/26/19 23:52  07/26/19 23:52  07/26/19 23:52  07/26/19 23:52  07/26/19 23:52








                                 Intake & Output











 07/26/19 07/27/19 07/28/19





 06:59 06:59 06:59


 


Intake Total 0 0 


 


Output Total 0  


 


Balance 0 0 


 


Weight 52.3 kg 52.4 kg 














Results


Laboratory Results: 


                                        





                                 07/21/19 05:54 





                                 07/20/19 08:01 





                                        











  07/20/19 07/20/19





  08:01 08:01


 


Creatine Kinase  125 


 


Troponin I   < 0.012











Impressions: 


                                        





Cervical Spine CT  07/20/19 07:18


IMPRESSION:  CHRONIC DEGENERATIVE CHANGES.  NO ACUTE FINDINGS.


 








Head CT  07/20/19 07:18


IMPRESSION:  CHRONIC CHANGES OF ATROPHY AND MICROVASCULAR ISCHEMIA.  NO ACUTE 

PROCESS.


EVIDENCE OF ACUTE STROKE: NO.


 








Hip/Pelvis X-Ray  07/20/19 07:18


IMPRESSION:  NEGATIVE STUDY OF THE RIGHT HIP. NO RADIOGRAPHIC EVIDENCE OF ACUTE 

INJURY.


 








Head MRI  07/23/19 00:00


IMPRESSION:  1. The patient has developed bilateral subdural hematomas as 

described.  There is no midline shift.  Due to diffuse cerebral atrophy there is

no significant mass effect.


2.  Diffuse generalized atrophy and small-vessel ischemic changes stable in 

appearance.


EVIDENCE OF ACUTE STROKE: NO.


 








Pelvis CT  07/23/19 00:00


IMPRESSION:  Normal left hip.  Lumbar degenerative changes.


 














Assessment and Plan





- Diagnosis


(1) Acute encephalopathy


Is this a current diagnosis for this admission?: Yes   





(2) Dementia


Qualifiers: 


   Dementia type: Alzheimer's disease   Alzheimer's disease onset: unspecified 

onset   Dementia behavioral disturbance: without behavioral disturbance   

Qualified Code(s): G30.9 - Alzheimer's disease, unspecified; F02.80 - Dementia 

in other diseases classified elsewhere without behavioral disturbance   


Is this a current diagnosis for this admission?: Yes   





(3) Fall


Qualifiers: 


   Encounter type: initial encounter   Qualified Code(s): W19.XXXA - Unspecified

fall, initial encounter   


Is this a current diagnosis for this admission?: Yes   





(4) Urinary tract infection


Qualifiers: 


   Urinary tract infection type: acute cystitis   Hematuria presence: without 

hematuria   Qualified Code(s): N30.00 - Acute cystitis without hematuria   


Is this a current diagnosis for this admission?: Yes   





(5) Diabetes mellitus type II, controlled


Qualifiers: 


   Diabetes mellitus long term insulin use: without long term use   Diabetes 

mellitus complication status: without complication   Qualified Code(s): E11.9 - 

Type 2 diabetes mellitus without complications   


Is this a current diagnosis for this admission?: Yes   





(6) Dysphagia, pharyngeal phase


Is this a current diagnosis for this admission?: Yes   





(7) Comfort measures only status


Is this a current diagnosis for this admission?: Yes

## 2019-07-28 NOTE — DEATH SUMMARY
Death Summary


Date : 19


Time of Death:: 00:35


Autopsy: No


Resuscitation Status: Comfort Measures Only


Primary Care Provider: Everett Mazariegos MD


Consulting Provider: Jose Vallejo





- Final Diagnosis


(1) Dementia


Is this a current diagnosis for this admission?: Yes   





(2) Dysphagia, pharyngeal phase


Is this a current diagnosis for this admission?: Yes   





(3) Concussion


Is this a current diagnosis for this admission?: Yes   





(4) Acute encephalopathy


Is this a current diagnosis for this admission?: Yes   





(5) Diabetes mellitus type II, controlled


Is this a current diagnosis for this admission?: Yes   





(6) Urinary tract infection


Is this a current diagnosis for this admission?: Yes   


Hospital Course:: 


: KIRK BRANDT is a 74 year old female with advanced dementia.  She has

a history of hypothyroidism and peptic ulcer disease but most prominently is her

very advanced dementia.  Her  reports that she has had an unsteady gait 

for approximately a month.  He notices that when she ambulates she tends to be 

wobbly.  Is not specifically to any one side.  He states that she has fallen in 

the past but never down a flight of stairs.  He states that the patient was 

going to go downstairs.  He heard her fall.  When he rushed to her side she was 

at the bottom of a flight of 7 stairs.  The stairs were carpeted.  She was on t

he floor with her head against the wall.  He did not see any abrasions or 

lacerations.  He did say that she was not speaking and had a gurgling type 

breath sound.  Her eyes were wide open.  He went to call EMS and when he got 

back she had started talking again.  EMS then transported her to the emergency 

department.  On evaluation she does have white blood cells and trace leukocyte 

esterase in her urine.  Imaging did not reveal any acute injuries such as 

subdural hematoma or spinal compression fractures.  





: This morning she was noted to have questionable right-sided weakness.  

Stat MRI was done which was negative for an acute CVA.   does say that 

patient has been having worsening confusion and falls at home and is looking to 

placing her in long term care.





: No acute event overnight.  Patient is oriented to any sphere due to end-

stage dementia.  Await placement to SNF.  Urine culture grew pansensitive E. 

coli.  We will switch IV antibiotics to PO.





2019-patient is admitted with altered mental status she has advanced 

dementia as per the patient  and the family she fell at home prior to 

admission complaining of right-sided arm pain weakness right lower leg pain 

weakness at the time of admission MRI of the brain was done on  which shows 

increased density of the CSF fluid it can be due to old subdural hematomas or 

subdural hygromas.  No mass-effect was seen.  I had a long discussion with the 

family members along with hospice representative Vargas  wants to know 

exactly what happened like problem with swallowing right arm pain weakness and r

ight lower leg pain and weakness he wants the test to be repeated including the 

MRI of the brain once CT pelvis to be done to rule out any fracture of the hip. 

We are going to do those things today.  He is also requested to be placed on IV 

fluids for short-term and requesting for evaluation of the speech in 24 to 48 

hours.  Patient's  also requesting the physical therapy to be on board.  

She is a DNR/DNI.





-patient's family decided to make her comfort care measures only.  

Presently on IV morphine and IV Ativan.  Family's request is to keep the patient

here as long as we can.





2019-patient is on comfort care measures only.





2019-patient is on comfort care measures only.  Patient is on a scopolamine

patch, morphine and Ativan.  Comfortably in the bed.  Family members at bedside 

they prefer to keep the patient here over the weekend.





2019-patient is under comfort care measures only.  On IV Ativan and 

morphine and like with the scopolamine patches.  She has a bed available at 

hospice but the family members wants to keep her here until Monday.





2019: Patient was found pulseless and breathless by her nurse at 0035.  

Patient  peacefully and with dignity in her sleep.  Patient's body 

released to the mortuary service of the family's choice.